# Patient Record
Sex: FEMALE | Race: WHITE | Employment: UNEMPLOYED | URBAN - METROPOLITAN AREA
[De-identification: names, ages, dates, MRNs, and addresses within clinical notes are randomized per-mention and may not be internally consistent; named-entity substitution may affect disease eponyms.]

---

## 2019-04-19 PROBLEM — J45.20 MILD INTERMITTENT ASTHMA WITHOUT COMPLICATION: Status: ACTIVE | Noted: 2019-04-19

## 2019-04-19 PROBLEM — Z00.00 WELLNESS EXAMINATION: Status: ACTIVE | Noted: 2019-04-19

## 2020-02-02 PROBLEM — L03.039 INFECTED NAILBED OF TOE: Status: ACTIVE | Noted: 2020-02-02

## 2021-04-06 ENCOUNTER — VIRTUAL VISIT (OUTPATIENT)
Dept: FAMILY MEDICINE CLINIC | Age: 31
End: 2021-04-06
Payer: MEDICAID

## 2021-04-06 DIAGNOSIS — R05.9 COUGH: Primary | ICD-10-CM

## 2021-04-06 PROCEDURE — 99213 OFFICE O/P EST LOW 20 MIN: CPT | Performed by: INTERNAL MEDICINE

## 2021-04-06 RX ORDER — BENZONATATE 200 MG/1
200 CAPSULE ORAL
Qty: 21 CAP | Refills: 0 | Status: SHIPPED | OUTPATIENT
Start: 2021-04-06 | End: 2021-04-13

## 2021-04-06 RX ORDER — CODEINE PHOSPHATE AND GUAIFENESIN 10; 100 MG/5ML; MG/5ML
5 SOLUTION ORAL
Qty: 273 ML | Refills: 0 | Status: SHIPPED | OUTPATIENT
Start: 2021-04-06 | End: 2021-04-06 | Stop reason: SDUPTHER

## 2021-04-06 RX ORDER — CODEINE PHOSPHATE AND GUAIFENESIN 10; 100 MG/5ML; MG/5ML
5 SOLUTION ORAL
Qty: 273 ML | Refills: 0 | Status: SHIPPED | OUTPATIENT
Start: 2021-04-06 | End: 2021-04-09

## 2021-04-06 RX ORDER — PREDNISONE 10 MG/1
TABLET ORAL
Qty: 21 TAB | Refills: 0 | Status: SHIPPED | OUTPATIENT
Start: 2021-04-06 | End: 2022-04-13

## 2021-04-06 RX ORDER — AMOXICILLIN 875 MG/1
875 TABLET, FILM COATED ORAL 2 TIMES DAILY
Qty: 20 TAB | Refills: 0 | Status: SHIPPED | OUTPATIENT
Start: 2021-04-06 | End: 2021-04-16

## 2021-04-06 NOTE — PROGRESS NOTES
Learning Assessment 1/31/2020   PRIMARY LEARNER Patient   HIGHEST LEVEL OF EDUCATION - PRIMARY LEARNER  GRADUATED HIGH SCHOOL OR GED   BARRIERS PRIMARY LEARNER NONE   CO-LEARNER CAREGIVER No   PRIMARY LANGUAGE ENGLISH   LEARNER PREFERENCE PRIMARY DEMONSTRATION   ANSWERED BY Patient    RELATIONSHIP SELF       1. Have you been to the ER, urgent care clinic since your last visit? Hospitalized since your last visit? No    2. Have you seen or consulted any other health care providers outside of the [address] [place] [address] since your last visit? Include any pap smears or colon screening.  No

## 2021-04-06 NOTE — PROGRESS NOTES
Consent:  She and/or her healthcare decision maker is aware that this patient-initiated Telehealth encounter is a billable service, with coverage as determined by her insurance carrier. She is aware that she may receive a bill and has provided verbal consent to proceed: Yes    I was in the office while conducting this encounter via the doxy. me platform. Marjan Dukes is a 31 y.o. female who was seen by synchronous (real-time) audio-video technology on 4/6/2021. Pt was seen at home. No other participants in this encounter. Subjective:   Cold Symptoms  Patient presents today with chief concern of sore throat that has been persistent for the last 3 days. She feels \"hot and cold\" but has not taken her temperature and denies diaphoresis. Swallowing liquids and solids is like \"swallowing razors\". Does have a history of asthma and allergies and is currently on ProAir as needed as well as Flonase as needed. She does have a cough but states that this is not constant and she has been taking cough tablets to help. Denies congestion but does state that she has right ear pain, sinus pressure as well as a headache. No sick contacts of which she is aware. PMH, SH, Medications/Allergies: reviewed, on chart. Current Outpatient Medications   Medication Sig    amoxicillin (AMOXIL) 875 mg tablet Take 1 Tab by mouth two (2) times a day for 10 days.  benzonatate (TESSALON) 200 mg capsule Take 1 Cap by mouth three (3) times daily as needed for Cough for up to 7 days.  predniSONE (STERAPRED DS) 10 mg dose pack See administration instruction per 10mg dose pack    phenol throat spray (Chloraseptic Throat Spray) 1.4 % spray Take 1 Spray by mouth as needed for Sore throat.  guaiFENesin-codeine (ROBITUSSIN AC) 100-10 mg/5 mL solution Take 5 mL by mouth three (3) times daily as needed for Cough for up to 3 days. Max Daily Amount: 15 mL.     fluticasone propionate (FLONASE) 50 mcg/actuation nasal spray 2 Sprays by Both Nostrils route daily.  ProAir HFA 90 mcg/actuation inhaler INHALE 2 PUFFS BY MOUTH EVERY 4 HOURS AS NEEDED FOR WHEEZING FOR SHORTNESS OF BREATH     No current facility-administered medications for this visit. Allergies   Allergen Reactions    Cephalexin Swelling       ROS:  Constitutional: No fever, + chills no abnormal weight loss  Respiratory: Positive for cough, negative for SOB   CV: No chest pain or Palpitations    VS review: Wt Readings from Last 3 Encounters:   01/19/21 113 lb 12.8 oz (51.6 kg)   01/31/20 113 lb 9.6 oz (51.5 kg)   04/19/19 112 lb 6.4 oz (51 kg)     BP Readings from Last 3 Encounters:   01/19/21 108/60   01/31/20 140/80   04/19/19 118/70       Objective:     General: alert, cooperative, no distress   Mental  status: mental status: alert, oriented to person, place, and time, normal mood, behavior, speech, dress, motor activity, and thought processes   Resp: resp: normal effort and no respiratory distress   Neuro: neuro: no gross deficits   Skin: skin: no discoloration or lesions of concern on visible areas       Assessment & Plan:     Patient presents today with chief complaint of sore throat, likely secondary to postnasal drip or viral etiology. However, the right ear pain is concerning along with sinus pressure on the right side more than left side. We will treat for sinusitis which will also cover for strep throat as she does have recurrence of strep throat in case this is a secondary etiology. Symptomatic treatment for cough, congestion. Warm compresses to the face to help drain the sinuses. S/sx that would warrant urgent or emergent follow up and clinical re-evaluation discussed with the patient who verbalized understanding. Orders Placed This Encounter    amoxicillin (AMOXIL) 875 mg tablet     Sig: Take 1 Tab by mouth two (2) times a day for 10 days.      Dispense:  20 Tab     Refill:  0    benzonatate (TESSALON) 200 mg capsule     Sig: Take 1 Cap by mouth three (3) times daily as needed for Cough for up to 7 days. Dispense:  21 Cap     Refill:  0    DISCONTD: guaiFENesin-codeine (ROBITUSSIN AC) 100-10 mg/5 mL solution     Sig: Take 5 mL by mouth three (3) times daily as needed for Cough for up to 3 days. Max Daily Amount: 15 mL. Dispense:  273 mL     Refill:  0    predniSONE (STERAPRED DS) 10 mg dose pack     Sig: See administration instruction per 10mg dose pack     Dispense:  21 Tab     Refill:  0    phenol throat spray (Chloraseptic Throat Spray) 1.4 % spray     Sig: Take 1 Spray by mouth as needed for Sore throat. Dispense:  1 Bottle     Refill:  1    guaiFENesin-codeine (ROBITUSSIN AC) 100-10 mg/5 mL solution     Sig: Take 5 mL by mouth three (3) times daily as needed for Cough for up to 3 days. Max Daily Amount: 15 mL. Dispense:  273 mL     Refill:  0         Ailyn R Good, DO      Due to this being a TeleHealth evaluation, many elements of the physical examination are unable to be assessed. We discussed the expected course, resolution and complications of the diagnosis(es) in detail. Medication risks, benefits, costs, interactions, and alternatives were discussed as indicated. I advised her to contact the office if her condition worsens, changes or fails to improve as anticipated. She expressed understanding with the diagnosis(es) and plan. Pursuant to the emergency declaration under the [facility] [place] [address] [facility] authority and the [employer] Act, this Virtual  Visit was conducted, with patient's consent, to reduce the patient's risk of exposure to COVID-19 and provide continuity of care for an established patient. Services were provided through a video synchronous discussion virtually to substitute for in-person clinic visit. Services were provided through a synchronous discussion virtually to substitute for in-person clinic visit.  I was in the office. The patient was at home.     CPT Codes 01791-29526 for Established Patients may apply to this Telehealth Visit

## 2021-06-24 ENCOUNTER — OFFICE VISIT (OUTPATIENT)
Dept: FAMILY MEDICINE CLINIC | Age: 31
End: 2021-06-24
Payer: MEDICAID

## 2021-06-24 VITALS
DIASTOLIC BLOOD PRESSURE: 60 MMHG | BODY MASS INDEX: 22.8 KG/M2 | WEIGHT: 113.1 LBS | SYSTOLIC BLOOD PRESSURE: 100 MMHG | HEIGHT: 59 IN | OXYGEN SATURATION: 100 % | HEART RATE: 70 BPM | RESPIRATION RATE: 18 BRPM | TEMPERATURE: 97.2 F

## 2021-06-24 DIAGNOSIS — J45.20 MILD INTERMITTENT ASTHMA WITHOUT COMPLICATION: Primary | ICD-10-CM

## 2021-06-24 PROCEDURE — 99213 OFFICE O/P EST LOW 20 MIN: CPT | Performed by: NURSE PRACTITIONER

## 2021-06-24 RX ORDER — FLUTICASONE PROPIONATE 50 MCG
2 SPRAY, SUSPENSION (ML) NASAL DAILY
Qty: 1 BOTTLE | Refills: 5 | Status: SHIPPED | OUTPATIENT
Start: 2021-06-24 | End: 2022-04-13 | Stop reason: SDUPTHER

## 2021-06-24 RX ORDER — ALBUTEROL SULFATE 90 UG/1
AEROSOL, METERED RESPIRATORY (INHALATION)
Qty: 9 G | Refills: 1 | Status: SHIPPED | OUTPATIENT
Start: 2021-06-24 | End: 2022-04-13 | Stop reason: SDUPTHER

## 2021-06-24 NOTE — PROGRESS NOTES
1. Have you been to the ER, urgent care clinic since your last visit? Hospitalized since your last visit? No    2. Have you seen or consulted any other health care providers outside of the [address] [place] [address] since your last visit? Include any pap smears or colon screening.  No      Chief Complaint   Patient presents with    Asthma     refills         Visit Vitals  /60 (BP 1 Location: Left upper arm, BP Patient Position: Sitting, BP Cuff Size: Adult)   Pulse 70   Temp 97.2 °F (36.2 °C) (Temporal)   Resp 18   Ht 4' 11\" (1.499 m)   Wt 113 lb 1.6 oz (51.3 kg)   SpO2 100%   BMI 22.84 kg/m²       Pain Scale: 0 - No pain/10  Pain Location:

## 2021-06-27 NOTE — ACP (ADVANCE CARE PLANNING)
Discussed importance of advanced medical directives with patient. Patient is capable of making decisions.   Inez Soares NP-C

## 2021-06-27 NOTE — PROGRESS NOTES
Subjective:     Severiano Generous is a 27 y.o. female who presents today with the following:  Chief Complaint   Patient presents with    Asthma     refills       Patient Active Problem List   Diagnosis Code    Seizure disorder in pregnancy ([facility] O99.350, G40.909    Wellness examination Z00.00    Mild intermittent asthma without complication K69.88    Infected nailbed of toe L03.039    Pregnancy complicated by fetal skeletal dysplasia O35. 8XX0         COMPLIANT WITH MEDICATION:   Denies chest pain, dyspnea, palpitations, headache and blurred vision. Blood pressure normotensive. Asthma; no recent flare ups. Well controlled with Flonase for allergies. She would like a refill Pro Air HFA before she goes to [place] with her family. She denies wheezing and shortness of breath. depression screening addressed not at risk    abuse screening addressed denies    learning assessment addressed reviewed nurses notes    fall risk addressed not at risk    HM: addressed    ROS:  Gen: denies fever, chills, fatigue, weight loss, weight gain  HEENT:denies blurry vision, nasal congestion, sore throat  Resp: denies dypsnea, cough, wheezing  CV: denies chest pain radiating to the jaws or arms, palpitations, lower extremity edema  Abd: denies nausea, vomiting, diarrhea, constipation  Neuro: denies numbness/tingling  Endo: denies polyuria, polydipsia, heat/cold intolerance  Heme: no lymphadenopathy    Allergies   Allergen Reactions    Cephalexin Swelling         Current Outpatient Medications:     ProAir HFA 90 mcg/actuation inhaler, INHALE 2 PUFFS BY MOUTH EVERY 4 HOURS AS NEEDED FOR WHEEZING FOR SHORTNESS OF BREATH, Disp: 9 g, Rfl: 1    fluticasone propionate (FLONASE) 50 mcg/actuation nasal spray, 2 Sprays by Both Nostrils route daily. , Disp: 1 Bottle, Rfl: 5    predniSONE (STERAPRED DS) 10 mg dose pack, See administration instruction per 10mg dose pack (Patient not taking: Reported on 6/24/2021), Disp: 21 Tab, Rfl: 0   phenol throat spray (Chloraseptic Throat Spray) 1.4 % spray, Take 1 Spray by mouth as needed for Sore throat. (Patient not taking: Reported on 2021), Disp: 1 Bottle, Rfl: 1    Past Medical History:   Diagnosis Date    Abnormal Pap smear     Unspecified epilepsy without mention of intractable epilepsy     4 seizures in past 3 mos       Past Surgical History:   Procedure Laterality Date    HX GYN      csection.  HX TUBAL LIGATION      [place] ABDOMEN SURGERY PROC UNLISTED      niessen surgery and PEG tube as child.  [place]  DELIVERY ONLY         Social History     Tobacco Use   Smoking Status Current Every Day Smoker    Packs/day: 1.00    Years: 3.00    Pack years: 3.00   Smokeless Tobacco Never Used       Social History     Socioeconomic History    Marital status:      Spouse name: Not on file    Number of children: Not on file    Years of education: Not on file    Highest education level: Not on file   Tobacco Use    Smoking status: Current Every Day Smoker     Packs/day: 1.00     Years: 3.00     Pack years: 3.00    Smokeless tobacco: Never Used   Vaping Use    Vaping Use: Never used   Substance and Sexual Activity    Alcohol use: No    Drug use: Never    Sexual activity: Yes     Partners: Male     Birth control/protection: Pill, Condom, Surgical   Other Topics Concern     Service No    Blood Transfusions Yes    Caffeine Concern Yes    Occupational Exposure No    Hobby Hazards No    Sleep Concern No    Stress Concern No    Weight Concern Yes    Special Diet No    Back Care Yes    Exercise Yes    Seat Belt Yes    Self-Exams Yes     Social Determinants of Health     Financial Resource Strain:     Difficulty of Paying Living Expenses:    Food Insecurity:     Worried About Running Out of Food in the Last Year:     Ran Out of Food in the Last Year:    Transportation Needs:     Lack of Transportation (Medical):      Lack of Transportation (Non-Medical): Physical Activity:     Days of Exercise per Week:     Minutes of Exercise per Session:    Stress:     Feeling of Stress :    Social Connections:     Frequency of Communication with Friends and Family:     Frequency of Social Gatherings with Friends and Family:     Attends [employer] Services:     Active Member of Clubs or Organizations:     Attends Club or Organization Meetings:     Marital Status:        Family History   Problem Relation Age of Onset    Stroke Mother     Cancer Maternal Aunt     Diabetes Maternal Grandmother     Heart Disease Maternal Grandmother     Hypertension Maternal Grandmother     Heart Disease Maternal Grandfather          Objective:     Visit Vitals  /60 (BP 1 Location: Left upper arm, BP Patient Position: Sitting, BP Cuff Size: Adult)   Pulse 70   Temp 97.2 °F (36.2 °C) (Temporal)   Resp 18   Ht 4' 11\" (1.499 m)   Wt 113 lb 1.6 oz (51.3 kg)   SpO2 100%   BMI 22.84 kg/m²     Body mass index is 22.84 kg/m². General: Alert and oriented. No acute distress. Well nourished  HEENT :  Ears:TMs are normal. Canals are clear. Eyes: pupils equal, round, react to light and accommodation. Extra ocular movements intact. Nose: patent. Mouth and throat is clear. Neck:supple full range of motion no thyromegaly. Trachea midline, No carotid bruits. No significant lymphadenopathy  Lungs[de-identified] clear to auscultation without wheezes, rales, or rhonchi. Heart :RRR, S1 & S2 are normal intensity. No murmur; no gallop  Abdomen: bowel sounds active. No tenderness, guarding, rebound, masses, hepatic or spleen enlargement  Back: no CVA tenderness. Extremities: without clubbing, cyanosis, or edema  Pulses: radial and femoral pulses are normal  Neuro: HMF intact. Cranial nerves II through XII grossly normal.  Motor: is 5 over 5 and symmetrical.   Deep tendon reflexes: +2 equal  Psych:appropriate behavior, mood, affect and judgement.    Results for orders placed or performed in visit on 04/19/19 CBC WITH AUTOMATED DIFF   Result Value Ref Range    WBC 5.9 3.4 - 10.8 x10E3/uL    RBC 4.39 3.77 - 5.28 x10E6/uL    HGB 13.1 11.1 - 15.9 g/dL    HCT 39.8 34.0 - 46.6 %    MCV 91 79 - 97 fL    MCH 29.8 26.6 - 33.0 pg    MCHC 32.9 31.5 - 35.7 g/dL    RDW 14.4 12.3 - 15.4 %    PLATELET 973 015 - 652 x10E3/uL    NEUTROPHILS 45 Not Estab. %    Lymphocytes 37 Not Estab. %    MONOCYTES 9 Not Estab. %    EOSINOPHILS 8 Not Estab. %    BASOPHILS 1 Not Estab. %    ABS. NEUTROPHILS 2.7 1.4 - 7.0 x10E3/uL    Abs Lymphocytes 2.2 0.7 - 3.1 x10E3/uL    ABS. MONOCYTES 0.5 0.1 - 0.9 x10E3/uL    ABS. EOSINOPHILS 0.5 (H) 0.0 - 0.4 x10E3/uL    ABS. BASOPHILS 0.1 0.0 - 0.2 x10E3/uL    IMMATURE GRANULOCYTES 0 Not Estab. %    ABS. IMM. GRANS. 0.0 0.0 - 0.1 x10E3/uL   TSH RFX ON ABNORMAL TO FREE T4   Result Value Ref Range    TSH 0.940 0.450 - 1.992 uIU/mL   METABOLIC PANEL, COMPREHENSIVE   Result Value Ref Range    Glucose 79 65 - 99 mg/dL    BUN 9 6 - 20 mg/dL    Creatinine 0.83 0.57 - 1.00 mg/dL    GFR est non-AA 95 >59 mL/min/1.73    GFR est  >59 mL/min/1.73    BUN/Creatinine ratio 11 9 - 23    Sodium 140 134 - 144 mmol/L    Potassium 4.3 3.5 - 5.2 mmol/L    Chloride 104 96 - 106 mmol/L    CO2 22 20 - 29 mmol/L    Calcium 9.1 8.7 - 10.2 mg/dL    Protein, total 6.6 6.0 - 8.5 g/dL    Albumin 4.4 3.5 - 5.5 g/dL    GLOBULIN, TOTAL 2.2 1.5 - 4.5 g/dL    A-G Ratio 2.0 1.2 - 2.2    Bilirubin, total 0.6 0.0 - 1.2 mg/dL    Alk. phosphatase 51 39 - 117 IU/L    AST (SGOT) 14 0 - 40 IU/L    ALT (SGPT) 16 0 - 32 IU/L   LIPID PANEL   Result Value Ref Range    Cholesterol, total 191 100 - 199 mg/dL    Triglyceride 46 0 - 149 mg/dL    HDL Cholesterol 65 >39 mg/dL    VLDL, calculated 9 5 - 40 mg/dL    LDL, calculated 117 (H) 0 - 99 mg/dL   HEMOGLOBIN A1C WITH EAG   Result Value Ref Range    Hemoglobin A1c 5.2 4.8 - 5.6 %    Estimated average glucose 103 mg/dL       No results found for this visit on 06/24/21.     Assessment/ Plan:       ICD-10-CM ICD-9-CM    1. Mild intermittent asthma without complication  S24.88 371.93        Orders Placed This Encounter    ProAir HFA 90 mcg/actuation inhaler     Sig: INHALE 2 PUFFS BY MOUTH EVERY 4 HOURS AS NEEDED FOR WHEEZING FOR SHORTNESS OF BREATH     Dispense:  9 g     Refill:  1    fluticasone propionate (FLONASE) 50 mcg/actuation nasal spray     Si Sprays by Both Nostrils route daily. Dispense:  1 Bottle     Refill:  5         Verbal and written instructions (see AVS) provided. Patient expresses understanding of diagnosis and treatment plan.     Health Maintenance Due   Topic Date Due    Hepatitis C Screening  Never done    PAP AKA CERVICAL CYTOLOGY  2021               LIANNA DawsonP-C

## 2022-03-19 PROBLEM — J45.20 MILD INTERMITTENT ASTHMA WITHOUT COMPLICATION: Status: ACTIVE | Noted: 2019-04-19

## 2022-03-19 PROBLEM — Z00.00 WELLNESS EXAMINATION: Status: ACTIVE | Noted: 2019-04-19

## 2022-03-19 PROBLEM — L03.039 INFECTED NAILBED OF TOE: Status: ACTIVE | Noted: 2020-02-02

## 2022-04-13 ENCOUNTER — VIRTUAL VISIT (OUTPATIENT)
Dept: FAMILY MEDICINE CLINIC | Age: 32
End: 2022-04-13
Payer: MEDICAID

## 2022-04-13 DIAGNOSIS — J00 ACUTE RHINITIS: ICD-10-CM

## 2022-04-13 DIAGNOSIS — J45.20 MILD INTERMITTENT ASTHMA WITHOUT COMPLICATION: Primary | ICD-10-CM

## 2022-04-13 DIAGNOSIS — G44.209 ACUTE NON INTRACTABLE TENSION-TYPE HEADACHE: ICD-10-CM

## 2022-04-13 PROCEDURE — 99442 PR PHYS/QHP TELEPHONE EVALUATION 11-20 MIN: CPT

## 2022-04-13 RX ORDER — MONTELUKAST SODIUM 10 MG/1
10 TABLET ORAL DAILY
Qty: 90 TABLET | Refills: 3 | Status: SHIPPED | OUTPATIENT
Start: 2022-04-13

## 2022-04-13 RX ORDER — ALBUTEROL SULFATE 90 UG/1
AEROSOL, METERED RESPIRATORY (INHALATION)
Qty: 9 G | Refills: 1 | Status: SHIPPED | OUTPATIENT
Start: 2022-04-13

## 2022-04-13 RX ORDER — CETIRIZINE HYDROCHLORIDE 10 MG/1
CAPSULE, LIQUID FILLED ORAL DAILY
COMMUNITY

## 2022-04-13 RX ORDER — IBUPROFEN 600 MG/1
600 TABLET ORAL
Qty: 90 TABLET | Refills: 1 | OUTPATIENT
Start: 2022-04-13 | End: 2022-05-30

## 2022-04-13 RX ORDER — FLUTICASONE PROPIONATE 50 MCG
2 SPRAY, SUSPENSION (ML) NASAL DAILY
Qty: 1 EACH | Refills: 5 | Status: SHIPPED | OUTPATIENT
Start: 2022-04-13

## 2022-04-13 NOTE — PATIENT INSTRUCTIONS
Managing Your Allergies: Care Instructions  Your Care Instructions     Managing your allergies is an important part of staying healthy. Your doctor will help you find out what may be the cause of the allergies. Common causes of symptoms are house dust and dust mites, animal dander, mold, and pollen. As soon as you know what triggers your symptoms, try to avoid those things. This can help prevent allergy symptoms, asthma, and other health problems. Ask your doctor about allergy medicine or immunotherapy. These treatments may help reduce or prevent allergy symptoms. Follow-up care is a key part of your treatment and safety. Be sure to make and go to all appointments, and call your doctor if you are having problems. It's also a good idea to know your test results and keep a list of the medicines you take. How can you care for yourself at home? · If you have been told by your doctor that dust or dust mites are causing your allergy, decrease the dust around your bed:  ? Wash sheets, pillowcases, and other bedding in hot water every week. ? Use dust-proof covers for pillows, duvets, and mattresses. Avoid plastic covers because they tear easily and do not \"breathe. \" Wash as instructed on the label. ? Do not use any blankets and pillows that you do not need. ? Use blankets that you can wash in your washing machine. ? Consider removing drapes and carpets, which attract and hold dust, from your bedroom. · If you are allergic to house dust and mites, do not use home humidifiers. Your doctor can suggest ways you can control dust and mites. · Look for signs of cockroaches. Cockroaches cause allergic reactions. Use cockroach baits to get rid of them. Then, clean your home well. Cockroaches like areas where grocery bags, newspapers, empty bottles, or cardboard boxes are stored. Do not keep these inside your home, and keep trash and food containers sealed.  Seal off any spots where cockroaches might enter your home.  · If you are allergic to mold, get rid of furniture, rugs, and drapes that smell musty. Check for mold in the bathroom. · If you are allergic to outdoor pollen or mold spores, use air-conditioning. Change or clean all filters every month. Keep windows closed. · If you are allergic to pollen, stay inside when pollen counts are high. Use a vacuum  with a HEPA filter or a double-thickness filter at least two times each week. · Stay inside when air pollution is bad. Avoid paint fumes, perfumes, and other strong odors. · Avoid conditions that make your allergies worse. Stay away from smoke. Do not smoke or let anyone else smoke in your house. Do not use fireplaces or wood-burning stoves. · If you are allergic to your pets, change the air filter in your furnace every month. Use high-efficiency filters. · If you are allergic to pet dander, keep pets outside or out of your bedroom. Old carpet and cloth furniture can hold a lot of animal dander. You may need to replace them. When should you call for help? Give an epinephrine shot if:    · You think you are having a severe allergic reaction. After giving an epinephrine shot call 911, even if you feel better. Call 911 if:    · You have symptoms of a severe allergic reaction. These may include:  ? Sudden raised, red areas (hives) all over your body. ? Swelling of the throat, mouth, lips, or tongue. ? Trouble breathing. ? Passing out (losing consciousness). Or you may feel very lightheaded or suddenly feel weak, confused, or restless.     · You have been given an epinephrine shot, even if you feel better. Call your doctor now or seek immediate medical care if:    · You have symptoms of an allergic reaction, such as:  ? A rash or hives (raised, red areas on the skin). ? Itching. ? Swelling. ? Belly pain, nausea, or vomiting.    Watch closely for changes in your health, and be sure to contact your doctor if:    · Your allergies get worse.     · You need help controlling your allergies.     · You have questions about allergy testing.     · You do not get better as expected. Where can you learn more? Go to http://www.gray.com/  Enter L249 in the search box to learn more about \"Managing Your Allergies: Care Instructions. \"  Current as of: February 10, 2021               Content Version: 13.2  © 5092-0093 [employer]. Care instructions adapted under license by [employer] (which disclaims liability or warranty for this information). If you have questions about a medical condition or this instruction, always ask your healthcare professional. [place] [address] any warranty or liability for your use of this information.

## 2022-04-13 NOTE — PROGRESS NOTES
1. Have you been to the ER, urgent care clinic since your last visit? No  Hospitalized since your last visit? No    2. Have you seen or consulted any other health care providers outside of the [address] [place] [address] since your last visit? Include any pap smears or colon screening.  No

## 2022-04-13 NOTE — PROGRESS NOTES
Hung Echols (: 1990) is a 27 y.o. female, established patient, here for evaluation of the following chief complaint(s):   Headache (x days, temples and across her forehead, 8/10), Cold Symptoms (little dry cough), Sore Throat (with some drainage), and Sweats (hot and cold moments)       ASSESSMENT/PLAN:  Below is the assessment and plan developed based on review of pertinent history, labs, studies, and medications. 1. Mild intermittent asthma without complication  -     ProAir HFA 90 mcg/actuation inhaler; INHALE 2 PUFFS BY MOUTH EVERY 4 HOURS AS NEEDED FOR WHEEZING FOR SHORTNESS OF BREATH, Normal, Disp-9 g, R-1, LAURA  -     montelukast (SINGULAIR) 10 mg tablet; Take 1 Tablet by mouth daily. , Normal, Disp-90 Tablet, R-3  2. Acute rhinitis  -     fluticasone propionate (FLONASE) 50 mcg/actuation nasal spray; 2 Sprays by Both Nostrils route daily. , Normal, Disp-1 Each, R-5  -     montelukast (SINGULAIR) 10 mg tablet; Take 1 Tablet by mouth daily. , Normal, Disp-90 Tablet, R-3  3. Acute non intractable tension-type headache  -     ibuprofen (MOTRIN) 600 mg tablet; Take 1 Tablet by mouth every eight (8) hours as needed for Pain., Normal, Disp-90 Tablet, R-1       Return if symptoms worsen or fail to improve. SUBJECTIVE/OBJECTIVE:  Hung Echols notes headache onset about 2 days ago; pain is located in both temples and continues in a band around her forehead. She took a dose of APAP with some relief. Yesterday, she noted a mild cough and sore throat; she has had chills with TMax 99.1. No sick contacts that she is aware of. She has been taking Zyrtec for about 1 week. Review of Systems   Constitutional: Positive for chills. Negative for activity change, appetite change, fatigue and fever. HENT: Positive for congestion and sore throat. Negative for ear pain. Eyes: Negative. Respiratory: Positive for cough. Negative for chest tightness, shortness of breath and wheezing.     Musculoskeletal: Negative. Negative for arthralgias and myalgias. Skin: Negative. Negative for rash. Allergic/Immunologic: Positive for environmental allergies. Neurological: Positive for headaches. Negative for dizziness. Patient-Reported Pulse Oximetry: 96  Patient-Reported Temperature: 99.1  Patient-Reported LMP: 03/15/2022     On this date 04/13/2022 I have spent 20 minutes reviewing previous notes, test results and face to face (virtual) with the patient discussing the diagnosis and importance of compliance with the treatment plan as well as documenting on the day of the visit. Brendamain Josiah, was evaluated through a synchronous (real-time) audio only encounter. The patient (or guardian if applicable) is aware that this is a billable service, which includes applicable co-pays. Verbal consent to proceed has been obtained. The visit was conducted pursuant to the emergency declaration under the [address] [place] [address], [address] [place] [address] authority and the [employer] and [employer] Act. Patient identification was verified, and a caregiver was present when appropriate. The patient was located at home in a state where the provider was licensed to provide care. An electronic signature was used to authenticate this note.   -- Harlan Verdugo NP

## 2022-05-20 ENCOUNTER — APPOINTMENT (OUTPATIENT)
Dept: GENERAL RADIOLOGY | Age: 32
End: 2022-05-20
Attending: EMERGENCY MEDICINE
Payer: MEDICAID

## 2022-05-20 ENCOUNTER — HOSPITAL ENCOUNTER (EMERGENCY)
Age: 32
Discharge: HOME | End: 2022-05-20
Attending: EMERGENCY MEDICINE
Payer: MEDICAID

## 2022-05-20 ENCOUNTER — NURSE TRIAGE (OUTPATIENT)
Dept: OTHER | Facility: CLINIC | Age: 32
End: 2022-05-20

## 2022-05-20 VITALS
TEMPERATURE: 98.4 F | HEART RATE: 90 BPM | HEIGHT: 59 IN | BODY MASS INDEX: 22.78 KG/M2 | DIASTOLIC BLOOD PRESSURE: 70 MMHG | WEIGHT: 113 LBS | RESPIRATION RATE: 18 BRPM | SYSTOLIC BLOOD PRESSURE: 114 MMHG | OXYGEN SATURATION: 100 %

## 2022-05-20 DIAGNOSIS — R07.9 CHEST PAIN, UNSPECIFIED TYPE: Primary | ICD-10-CM

## 2022-05-20 LAB
ALBUMIN SERPL-MCNC: 3.8 G/DL (ref 3.5–5)
ALBUMIN/GLOB SERPL: 1.1 {RATIO} (ref 1.1–2.2)
ALP SERPL-CCNC: 60 U/L (ref 45–117)
ALT SERPL-CCNC: 21 U/L (ref 12–78)
ANION GAP SERPL CALC-SCNC: 12 MMOL/L (ref 5–15)
APPEARANCE UR: CLEAR
AST SERPL-CCNC: 12 U/L (ref 15–37)
BACTERIA URNS QL MICRO: NEGATIVE /HPF
BASOPHILS # BLD: 0.1 K/UL (ref 0–0.1)
BASOPHILS NFR BLD: 1 % (ref 0–1)
BILIRUB SERPL-MCNC: 0.3 MG/DL (ref 0.2–1)
BILIRUB UR QL: NEGATIVE
BUN SERPL-MCNC: 10 MG/DL (ref 6–20)
BUN/CREAT SERPL: 11 (ref 12–20)
CALCIUM SERPL-MCNC: 8.6 MG/DL (ref 8.5–10.1)
CHLORIDE SERPL-SCNC: 106 MMOL/L (ref 97–108)
CO2 SERPL-SCNC: 24 MMOL/L (ref 21–32)
COLOR UR: NORMAL
CREAT SERPL-MCNC: 0.95 MG/DL (ref 0.55–1.02)
D DIMER PPP FEU-MCNC: 0.55 MG/L FEU (ref 0–0.65)
DIFFERENTIAL METHOD BLD: NORMAL
EOSINOPHIL # BLD: 0.1 K/UL (ref 0–0.4)
EOSINOPHIL NFR BLD: 2 % (ref 0–7)
EPITH CASTS URNS QL MICRO: NORMAL /LPF
ERYTHROCYTE [DISTWIDTH] IN BLOOD BY AUTOMATED COUNT: 13.3 % (ref 11.5–14.5)
GLOBULIN SER CALC-MCNC: 3.5 G/DL (ref 2–4)
GLUCOSE SERPL-MCNC: 146 MG/DL (ref 65–100)
GLUCOSE UR STRIP.AUTO-MCNC: NEGATIVE MG/DL
HCG UR QL: NEGATIVE
HCT VFR BLD AUTO: 36.3 % (ref 35–47)
HGB BLD-MCNC: 12.4 G/DL (ref 11.5–16)
HGB UR QL STRIP: NEGATIVE
IMM GRANULOCYTES # BLD AUTO: 0 K/UL (ref 0–0.04)
IMM GRANULOCYTES NFR BLD AUTO: 0 % (ref 0–0.5)
KETONES UR QL STRIP.AUTO: NEGATIVE MG/DL
LEUKOCYTE ESTERASE UR QL STRIP.AUTO: NEGATIVE
LYMPHOCYTES # BLD: 2 K/UL (ref 0.8–3.5)
LYMPHOCYTES NFR BLD: 35 % (ref 12–49)
MCH RBC QN AUTO: 29.5 PG (ref 26–34)
MCHC RBC AUTO-ENTMCNC: 34.2 G/DL (ref 30–36.5)
MCV RBC AUTO: 86.2 FL (ref 80–99)
MONOCYTES # BLD: 0.4 K/UL (ref 0–1)
MONOCYTES NFR BLD: 8 % (ref 5–13)
NEUTS SEG # BLD: 3.1 K/UL (ref 1.8–8)
NEUTS SEG NFR BLD: 54 % (ref 32–75)
NITRITE UR QL STRIP.AUTO: NEGATIVE
NRBC # BLD: 0 K/UL (ref 0–0.01)
NRBC BLD-RTO: 0 PER 100 WBC
PH UR STRIP: 6 [PH] (ref 5–8)
PLATELET # BLD AUTO: 346 K/UL (ref 150–400)
PMV BLD AUTO: 9.2 FL (ref 8.9–12.9)
POTASSIUM SERPL-SCNC: 3.7 MMOL/L (ref 3.5–5.1)
PROT SERPL-MCNC: 7.3 G/DL (ref 6.4–8.2)
PROT UR STRIP-MCNC: NEGATIVE MG/DL
RBC # BLD AUTO: 4.21 M/UL (ref 3.8–5.2)
RBC #/AREA URNS HPF: NORMAL /HPF (ref 0–5)
SODIUM SERPL-SCNC: 142 MMOL/L (ref 136–145)
SP GR UR REFRACTOMETRY: 1.03 (ref 1–1.03)
TROPONIN-HIGH SENSITIVITY: 5 NG/L (ref 0–51)
UR CULT HOLD, URHOLD: NORMAL
UROBILINOGEN UR QL STRIP.AUTO: 1 EU/DL (ref 0.2–1)
WBC # BLD AUTO: 5.7 K/UL (ref 3.6–11)
WBC URNS QL MICRO: NORMAL /HPF (ref 0–4)

## 2022-05-20 PROCEDURE — 36415 COLL VENOUS BLD VENIPUNCTURE: CPT

## 2022-05-20 PROCEDURE — 85379 FIBRIN DEGRADATION QUANT: CPT

## 2022-05-20 PROCEDURE — 85025 COMPLETE CBC W/AUTO DIFF WBC: CPT

## 2022-05-20 PROCEDURE — 84484 ASSAY OF TROPONIN QUANT: CPT

## 2022-05-20 PROCEDURE — 99285 EMERGENCY DEPT VISIT HI MDM: CPT

## 2022-05-20 PROCEDURE — 81001 URINALYSIS AUTO W/SCOPE: CPT

## 2022-05-20 PROCEDURE — 94762 N-INVAS EAR/PLS OXIMTRY CONT: CPT

## 2022-05-20 PROCEDURE — 71046 X-RAY EXAM CHEST 2 VIEWS: CPT

## 2022-05-20 PROCEDURE — 93005 ELECTROCARDIOGRAM TRACING: CPT

## 2022-05-20 PROCEDURE — 80053 COMPREHEN METABOLIC PANEL: CPT

## 2022-05-20 PROCEDURE — 81025 URINE PREGNANCY TEST: CPT

## 2022-05-20 PROCEDURE — 74011250636 HC RX REV CODE- 250/636: Performed by: EMERGENCY MEDICINE

## 2022-05-20 PROCEDURE — 96374 THER/PROPH/DIAG INJ IV PUSH: CPT

## 2022-05-20 RX ORDER — KETOROLAC TROMETHAMINE 30 MG/ML
30 INJECTION, SOLUTION INTRAMUSCULAR; INTRAVENOUS
Status: COMPLETED | OUTPATIENT
Start: 2022-05-20 | End: 2022-05-20

## 2022-05-20 RX ADMIN — KETOROLAC TROMETHAMINE 30 MG: 30 INJECTION, SOLUTION INTRAMUSCULAR at 13:45

## 2022-05-20 NOTE — TELEPHONE ENCOUNTER
Received call from [address] at [facility] with Red Flag Complaint. Subjective: Caller states \"has a cough, worried about bronchitis. Also chest pain on left side with pressure. \"     Current Symptoms: cough, chest pain  Chest pain is constant and on left side  No breathing difficulty  Radiated to shoulder yesterday  Has a deep wet cough  No personal cardiac history    Onset: couple days    Associated Symptoms: NA    Pain Severity: 6/10; sharp; constant    Temperature: no fever    What has been tried: tylenol    LMP: NA Pregnant: No    Recommended disposition: Call  Now         Care advice provided, patient verbalizes understanding; denies any other questions or concerns; instructed to call back for any new or worsening symptoms. Patient/caller agrees to calling 911    is there with her. She is agreeable to call 911. Attention Provider: Thank you for allowing me to participate in the care of your patient. The patient was connected to triage in response to information provided to the ECC. Please do not respond through this encounter as the response is not directed to a shared pool.     Reason for Disposition   Chest pain lasting longer than 5 minutes and ANY of the following:* Over 40years old* Over 27years old and at least one cardiac risk factor (e.g., diabetes mellitus, high blood pressure, high cholesterol, smoker, or strong family history of heart disease)* History of heart disease (i.e., angina, heart attack, heart failure, bypass surgery, takes nitroglycerin)* Pain is crushing, pressure-like, or heavy    Protocols used: CHEST PAIN-ADULT-[place]

## 2022-05-20 NOTE — ED PROVIDER NOTES
28-year-old female with a history of asthma presents with a chief complaint of chest and back pain. Patient reports symptoms for 3 days. She describes the pain as sharp in nature. She does not rated on a scale. She denies aggravating or alleviating factors. The pain is in the left lower lung and left upper lung. She has had a cough which has been nonproductive. She is a smoker. She has not on any birth control. She denies history of similar symptoms in the past.           Past Medical History:   Diagnosis Date    Abnormal Pap smear     Unspecified epilepsy without mention of intractable epilepsy     4 seizures in past 3 mos       Past Surgical History:   Procedure Laterality Date    HX GYN      csection.  HX TUBAL LIGATION      [place] ABDOMEN SURGERY PROC UNLISTED      niessen surgery and PEG tube as child.     [place]  DELIVERY ONLY           Family History:   Problem Relation Age of Onset    Stroke Mother     Cancer Maternal Aunt     Diabetes Maternal Grandmother     Heart Disease Maternal Grandmother     Hypertension Maternal Grandmother     Heart Disease Maternal Grandfather        Social History     Socioeconomic History    Marital status:      Spouse name: Not on file    Number of children: Not on file    Years of education: Not on file    Highest education level: Not on file   Occupational History    Not on file   Tobacco Use    Smoking status: Current Every Day Smoker     Packs/day: 1.00     Years: 3.00     Pack years: 3.00    Smokeless tobacco: Never Used   Vaping Use    Vaping Use: Never used   Substance and Sexual Activity    Alcohol use: No    Drug use: Never    Sexual activity: Yes     Partners: Male     Birth control/protection: Pill, Condom, Surgical   Other Topics Concern     Service No    Blood Transfusions Yes    Caffeine Concern Yes    Occupational Exposure No    Hobby Hazards No    Sleep Concern No    Stress Concern No    Weight Concern Yes    Special Diet No    Back Care Yes    Exercise Yes    Bike Helmet Not Asked    Seat Belt Yes    Self-Exams Yes   Social History Narrative    Not on file     Social Determinants of Health     Financial Resource Strain:     Difficulty of Paying Living Expenses: Not on file   Food Insecurity:     Worried About Running Out of Food in the Last Year: Not on file    Marianne of Food in the Last Year: Not on file   Transportation Needs:     Lack of Transportation (Medical): Not on file    Lack of Transportation (Non-Medical): Not on file   Physical Activity:     Days of Exercise per Week: Not on file    Minutes of Exercise per Session: Not on file   Stress:     Feeling of Stress : Not on file   Social Connections:     Frequency of Communication with Friends and Family: Not on file    Frequency of Social Gatherings with Friends and Family: Not on file    Attends Restorationism Services: Not on file    Active Member of [address] [place] [address] or Organizations: Not on file    Attends Club or Organization Meetings: Not on file    Marital Status: Not on file   Intimate Partner Violence:     Fear of Current or Ex-Partner: Not on file    Emotionally Abused: Not on file    Physically Abused: Not on file    Sexually Abused: Not on file   Housing Stability:     Unable to Pay for Housing in the Last Year: Not on file    Number of [place] in the Last Year: Not on file    Unstable Housing in the Last Year: Not on file         ALLERGIES: Cephalexin    Review of Systems   Constitutional: Negative for fever. HENT: Negative for rhinorrhea. Respiratory: Negative for shortness of breath. Cardiovascular: Positive for chest pain. Gastrointestinal: Negative for abdominal pain. Genitourinary: Negative for dysuria. Musculoskeletal: Positive for back pain. Skin: Negative for wound. Neurological: Negative for headaches. Psychiatric/Behavioral: Negative for confusion.        There were no vitals filed for this visit.         Physical Exam  Vitals and nursing note reviewed. Constitutional:       General: She is not in acute distress. Appearance: Normal appearance. She is well-developed. She is not ill-appearing, toxic-appearing or diaphoretic. HENT:      Head: Normocephalic and atraumatic. Eyes:      Extraocular Movements: Extraocular movements intact. Cardiovascular:      Rate and Rhythm: Normal rate. Pulses: Normal pulses. Heart sounds: Normal heart sounds. Pulmonary:      Effort: Pulmonary effort is normal. No respiratory distress. Breath sounds: Normal breath sounds. Abdominal:      General: There is no distension. Palpations: Abdomen is soft. Musculoskeletal:         General: Normal range of motion. Cervical back: Normal range of motion. Skin:     General: Skin is warm and dry. Neurological:      Mental Status: She is alert and oriented to person, place, and time. Psychiatric:         Mood and Affect: Mood normal.          MDM  Number of Diagnoses or Management Options  Chest pain, unspecified type  Diagnosis management comments:   Patient presents with chest pain and back pain. Differentials include but not limited to pneumonia, PE, pneumothorax, pleurisy, pyelonephritis among others. Chest x-ray and labs were obtained along with EKG. EKG is nonischemic. Labs show normal D-dimer and white blood cell count. Chest x-ray shows no evidence of pneumothorax or pneumonia. Urinalysis shows no evidence of infection. Patient will be advised to follow-up with family medicine. Discussed my clinical impression(s), any labs and/or radiology results with the patient. I answered any questions and addressed any concerns. Discussed the importance of following up with their primary care physician and/or specialist(s). Discussed signs or symptoms that would warrant return back to the ER for further evaluation. The patient is agreeable with discharge.     EKG shows sinus rhythm at a rate of 83, normal intervals, normal axis, no ischemic changes.          Procedures

## 2022-05-20 NOTE — DISCHARGE INSTRUCTIONS
Thank you for allowing us to provide you with medical care today. We realize that you have many choices for your emergency care needs. We thank you for choosing [facility]. Please choose us in the future for any continued health care needs. The exam and treatment you received in the Emergency Department were for an emergent problem and are not intended as complete care. It is important that you follow up with a doctor, nurse practitioner, or physician's assistant for ongoing care. If your symptoms worsen or you do not improve as expected and you are unable to reach your usual health care provider, you should return to the Emergency Department. We are available 24 hours a day. Please make an appointment with your health care provider(s) for follow up of your Emergency Department visit. Take this sheet with you when you go to your follow-up visit.

## 2022-05-22 LAB
ATRIAL RATE: 83 BPM
CALCULATED P AXIS, ECG09: 46 DEGREES
CALCULATED R AXIS, ECG10: 78 DEGREES
CALCULATED T AXIS, ECG11: 62 DEGREES
DIAGNOSIS, 93000: NORMAL
P-R INTERVAL, ECG05: 142 MS
Q-T INTERVAL, ECG07: 368 MS
QRS DURATION, ECG06: 92 MS
QTC CALCULATION (BEZET), ECG08: 432 MS
VENTRICULAR RATE, ECG03: 83 BPM

## 2022-05-30 ENCOUNTER — HOSPITAL ENCOUNTER (EMERGENCY)
Age: 32
Discharge: HOME | End: 2022-05-30
Attending: EMERGENCY MEDICINE
Payer: MEDICAID

## 2022-05-30 VITALS
OXYGEN SATURATION: 98 % | HEIGHT: 59 IN | RESPIRATION RATE: 15 BRPM | SYSTOLIC BLOOD PRESSURE: 118 MMHG | WEIGHT: 113 LBS | HEART RATE: 100 BPM | TEMPERATURE: 100.5 F | BODY MASS INDEX: 22.78 KG/M2 | DIASTOLIC BLOOD PRESSURE: 63 MMHG

## 2022-05-30 DIAGNOSIS — J02.9 VIRAL PHARYNGITIS: ICD-10-CM

## 2022-05-30 DIAGNOSIS — B34.9 VIRAL SYNDROME: Primary | ICD-10-CM

## 2022-05-30 LAB — DEPRECATED S PYO AG THROAT QL EIA: NEGATIVE

## 2022-05-30 PROCEDURE — 87070 CULTURE OTHR SPECIMN AEROBIC: CPT

## 2022-05-30 PROCEDURE — 74011250637 HC RX REV CODE- 250/637: Performed by: EMERGENCY MEDICINE

## 2022-05-30 PROCEDURE — 87880 STREP A ASSAY W/OPTIC: CPT

## 2022-05-30 PROCEDURE — 99283 EMERGENCY DEPT VISIT LOW MDM: CPT

## 2022-05-30 PROCEDURE — 87147 CULTURE TYPE IMMUNOLOGIC: CPT

## 2022-05-30 RX ORDER — ACETAMINOPHEN 160 MG/5ML
650 LIQUID ORAL
Qty: 236 ML | Refills: 0 | Status: SHIPPED | OUTPATIENT
Start: 2022-05-30 | End: 2022-06-02

## 2022-05-30 RX ORDER — TRIPROLIDINE/PSEUDOEPHEDRINE 2.5MG-60MG
600 TABLET ORAL
Status: COMPLETED | OUTPATIENT
Start: 2022-05-30 | End: 2022-05-30

## 2022-05-30 RX ORDER — TRIPROLIDINE/PSEUDOEPHEDRINE 2.5MG-60MG
600 TABLET ORAL
Qty: 237 ML | Refills: 0 | Status: SHIPPED | OUTPATIENT
Start: 2022-05-30 | End: 2022-06-02

## 2022-05-30 RX ADMIN — IBUPROFEN 600 MG: 100 SUSPENSION ORAL at 18:50

## 2022-05-30 RX ADMIN — ACETAMINOPHEN ORAL SOLUTION 650 MG: 160 SOLUTION ORAL at 18:50

## 2022-05-30 NOTE — ED TRIAGE NOTES
Pt arrived by POV with complaint of sore throat. Pt reports she has a sore throat with chills no fever. Pt reports she may have strep throat. Pt reports pain is worse when she swallows.   Pt is awake alert and oriented x 4, pt educated on ER flow

## 2022-05-30 NOTE — Clinical Note
[address] [place] [address] EMERGENCY DEP  [address] [place] [address] Dr Murillo Mini [id number]  [phone]    Work/School Note    Date: 5/30/2022    To Whom It May concern:    Marjorie Goff was seen and treated today in the emergency room by the following provider(s):  Attending Provider: Bk Gant MD.      Marjorie Goff is excused from work/school on 05/30/22 and 05/31/22. She is medically clear to return to work/school on 6/1/2022. Sincerely,          Paddy Martinez MD

## 2022-05-30 NOTE — ED NOTES
Bedside shift change report given to [address]  (oncoming nurse) by Noah García RN (offgoing nurse). Report included the following information SBAR, Kardex and ED Summary.

## 2022-05-30 NOTE — ED PROVIDER NOTES
EMERGENCY DEPARTMENT HISTORY AND PHYSICAL EXAM          Date: 5/30/2022  Patient Name: Massiel Manuel    History of Presenting Illness     Chief Complaint   Patient presents with    Sore Throat       History Provided By: Patient    HPI: Massiel Manuel is a 34 y.o. female, pmhx epilepsy, who presents ambulatory to the ED c/o sore throat    Explains she has been taking care of her ill son and this morning she woke up not feeling well. She started with fever and took Motrin but her last dose was early this morning. States she has a cough and her throat hurts extremely bad every time she coughs. She denies any nausea, vomiting, diarrhea as well as shortness of breath. PCP: Xochitl Perez NP    Allergies: Cephalexin  There are no other complaints, changes, or physical findings at this time. Current Facility-Administered Medications   Medication Dose Route Frequency Provider Last Rate Last Admin    acetaminophen (TYLENOL) solution 650 mg  650 mg Oral NOW Termeer, Suellyn Primrose., MD        ibuprofen (ADVIL;MOTRIN) 100 mg/5 mL oral suspension 600 mg  600 mg Oral NOW Termeer, Suellyn Primrose., MD         Current Outpatient Medications   Medication Sig Dispense Refill    acetaminophen (TYLENOL) 160 mg/5 mL liquid Take 20.3 mL by mouth every six (6) hours as needed for Pain for up to 3 days. 236 mL 0    ibuprofen (ADVIL;MOTRIN) 100 mg/5 mL suspension Take 30 mL by mouth four (4) times daily as needed for Fever for up to 3 days. 237 mL 0    Cetirizine (ZyrTEC) 10 mg cap Take  by mouth daily.  ProAir HFA 90 mcg/actuation inhaler INHALE 2 PUFFS BY MOUTH EVERY 4 HOURS AS NEEDED FOR WHEEZING FOR SHORTNESS OF BREATH 9 g 1    fluticasone propionate (FLONASE) 50 mcg/actuation nasal spray 2 Sprays by Both Nostrils route daily. 1 Each 5    montelukast (SINGULAIR) 10 mg tablet Take 1 Tablet by mouth daily.  90 Tablet 3    phenol throat spray (Chloraseptic Throat Spray) 1.4 % spray Take 1 Spray by mouth as needed for Sore throat. (Patient not taking: Reported on 2021) 1 Bottle 1       Past History     Past Medical History:  Past Medical History:   Diagnosis Date    Abnormal Pap smear     Unspecified epilepsy without mention of intractable epilepsy     4 seizures in past 3 mos       Past Surgical History:  Past Surgical History:   Procedure Laterality Date    HX GYN      csection.  HX TUBAL LIGATION      [place] ABDOMEN SURGERY PROC UNLISTED      niessen surgery and PEG tube as child.  AR  DELIVERY ONLY         Family History:  Family History   Problem Relation Age of Onset    Stroke Mother     Cancer Maternal Aunt     Diabetes Maternal Grandmother     Heart Disease Maternal Grandmother     Hypertension Maternal Grandmother     Heart Disease Maternal Grandfather        Social History:  Social History     Tobacco Use    Smoking status: Current Every Day Smoker     Packs/day: 1.00     Years: 3.00     Pack years: 3.00    Smokeless tobacco: Never Used   Vaping Use    Vaping Use: Never used   Substance Use Topics    Alcohol use: No    Drug use: Never       Allergies: Allergies   Allergen Reactions    Cephalexin Swelling         Review of Systems   Review of Systems   Constitutional: Positive for chills. Negative for activity change, appetite change, fever and unexpected weight change. HENT: Positive for sore throat. Negative for congestion. Eyes: Negative for pain and visual disturbance. Respiratory: Negative for cough and shortness of breath. Cardiovascular: Negative for chest pain. Gastrointestinal: Negative for abdominal pain, diarrhea, nausea and vomiting. Genitourinary: Negative for dysuria. Musculoskeletal: Negative for back pain. Skin: Negative for rash. Neurological: Negative for headaches. Physical Exam   Physical Exam  Vitals and nursing note reviewed. Constitutional:       Appearance: She is well-developed. She is not diaphoretic.    HENT:      Head: Normocephalic and atraumatic. Mouth/Throat:      Mouth: Mucous membranes are dry. No oral lesions. Pharynx: Posterior oropharyngeal erythema present. No pharyngeal swelling. Tonsils: No tonsillar exudate or tonsillar abscesses. 0 on the right. 0 on the left. Eyes:      General:         Right eye: No discharge. Left eye: No discharge. Conjunctiva/sclera: Conjunctivae normal.      Pupils: Pupils are equal, round, and reactive to light. Cardiovascular:      Rate and Rhythm: Regular rhythm. Tachycardia present. Heart sounds: Normal heart sounds. No murmur heard. Pulmonary:      Effort: Pulmonary effort is normal. No respiratory distress. Breath sounds: Normal breath sounds. No wheezing or rales. Abdominal:      General: Bowel sounds are normal. There is no distension. Palpations: Abdomen is soft. Tenderness: There is no abdominal tenderness. Musculoskeletal:         General: Normal range of motion. Cervical back: Normal range of motion and neck supple. Skin:     General: Skin is warm and dry. Findings: No rash. Comments: Patient is hot to touch   Neurological:      Mental Status: She is alert and oriented to person, place, and time. Cranial Nerves: No cranial nerve deficit. Motor: No abnormal muscle tone. Diagnostic Study Results     Labs -   No results found for this or any previous visit (from the past 12 hour(s)). Radiologic Studies -   No orders to display     CT Results  (Last 48 hours)    None        CXR Results  (Last 48 hours)    None            Medical Decision Making   I am the first provider for this patient. I reviewed the vital signs, available nursing notes, past medical history, past surgical history, family history and social history. Vital Signs-Reviewed the patient's vital signs.   Patient Vitals for the past 12 hrs:   Temp Pulse Resp BP SpO2   05/30/22 1807 (!) 101.2 °F (38.4 °C) (!) 108 16 124/72 98 %       Pulse Oximetry Analysis - 98% on RA    Records Reviewed: Nursing Notes and Old Medical Records    Provider Notes (Medical Decision Making):   MDM: Tracie Lopez female presenting with fever and sore throat and cough. Low concern for pneumonia, PE, intra-abdominal pathology. Symptomatic medications to be provided for fever. Patient is requesting strep testing. ED Course:   Initial assessment performed. The patients presenting problems have been discussed, and they are in agreement with the care plan formulated and outlined with them. I have encouraged them to ask questions as they arise throughout their visit. PROGRESS NOTE:  7PM  Pt signed out to Dr Jessica Domínguez, testing pending. Critical Care Time:   0      Diagnosis     Clinical Impression:   1. Viral syndrome    2. Pharyngitis, unspecified etiology        PLAN:  1. Current Discharge Medication List      START taking these medications    Details   acetaminophen (TYLENOL) 160 mg/5 mL liquid Take 20.3 mL by mouth every six (6) hours as needed for Pain for up to 3 days. Qty: 236 mL, Refills: 0  Start date: 5/30/2022, End date: 6/2/2022      ibuprofen (ADVIL;MOTRIN) 100 mg/5 mL suspension Take 30 mL by mouth four (4) times daily as needed for Fever for up to 3 days. Qty: 237 mL, Refills: 0  Start date: 5/30/2022, End date: 6/2/2022           2. Follow-up Information     Follow up With Specialties Details Why Contact Liliana Chang NP Nurse Practitioner  As needed [address] [facility] [address] [facility]      [address] [facility] [address] [facility] Emergency Medicine  If symptoms worsen [address] [phone]        Return to ED if worse     Disposition:  Home       Please note, this dictation was completed with [employer], the [employer] voice recognition software. Quite often unanticipated grammatical, syntax, homophones, and other interpretive errors are inadvertently transcribed by the computer software.  Please disregard these errors. Please excuse any errors that have escaped final proof reading.

## 2022-05-30 NOTE — DISCHARGE INSTRUCTIONS
--Tylenol 1000 mg every 6 hours as needed for fever/pain. --Ibuprofen 600 mg every 6 hours as needed for fever/pain. --Follow up with Ms Bryanna Kruse this week if you are not better.

## 2022-06-01 ENCOUNTER — VIRTUAL VISIT (OUTPATIENT)
Dept: FAMILY MEDICINE CLINIC | Age: 32
End: 2022-06-01

## 2022-06-01 DIAGNOSIS — J02.9 SORE THROAT: Primary | ICD-10-CM

## 2022-06-01 LAB
BACTERIA SPEC CULT: NORMAL
SERVICE CMNT-IMP: NORMAL

## 2022-06-01 PROCEDURE — 99442 PR PHYS/QHP TELEPHONE EVALUATION 11-20 MIN: CPT

## 2022-06-01 RX ORDER — PENICILLIN V POTASSIUM 250 MG/5ML
500 POWDER, FOR SOLUTION ORAL 4 TIMES DAILY
Qty: 280 ML | Refills: 0 | Status: SHIPPED | OUTPATIENT
Start: 2022-06-01 | End: 2022-08-15 | Stop reason: SDUPTHER

## 2022-06-01 RX ORDER — LIDOCAINE HYDROCHLORIDE 20 MG/ML
15 SOLUTION OROPHARYNGEAL AS NEEDED
Qty: 100 ML | Refills: 0 | Status: SHIPPED | OUTPATIENT
Start: 2022-06-01 | End: 2022-09-18

## 2022-06-01 NOTE — PROGRESS NOTES
Grace Hubbard (: 1990) is a 34 y.o. female, established patient, here for evaluation of the following chief complaint(s):   Hospital Follow Up (ED, [facility] BS), Sore Throat, Fever (on 2022 - 101.2), and Chills (off and on)       ASSESSMENT/PLAN:  Below is the assessment and plan developed based on review of pertinent history, labs, studies, and medications. 1. Sore throat  -     lidocaine (XYLOCAINE) 2 % solution; Take 15 mL by mouth as needed for Pain., Normal, Disp-100 mL, R-0    Continue symptomatic treatment pending throat culture. Use lidocaine gargle as needed for pain, ibuprofen and APAP for pain and fever. Return if symptoms worsen or fail to improve. SUBJECTIVE/OBJECTIVE:  Grace Hubbard endorses sore throat, chills, and fever onset about 4 days ago; she was seen in the ED at [facility] on  with a negative rapid strep screen (throat culture is still pending). She has been using ibuprofen and APAP with relief of her symptoms; she notes TMax 99.1 at home, but has chills and sweats. She denies other symptoms, including nasal congestion, ear pain, cough, SOB, CP. Review of Systems   Constitutional: Positive for chills and diaphoresis. Negative for fatigue and fever. HENT: Negative for congestion, sinus pressure and sore throat. Respiratory: Negative for cough, shortness of breath and wheezing. Cardiovascular: Negative for chest pain and palpitations. Gastrointestinal: Negative. Skin: Negative. Negative for rash. Neurological: Negative. Negative for dizziness and headaches. Patient-Reported Temperature: 99.1 (22)     On this date 2022 I have spent 15 minutes reviewing previous notes, test results and face to face (virtual) with the patient discussing the diagnosis and importance of compliance with the treatment plan as well as documenting on the day of the visit. Grace Hubbard, was evaluated through a synchronous (real-time) audio only encounter.  The patient (or guardian if applicable) is aware that this is a billable service, which includes applicable co-pays. This Virtual Visit was conducted with patient's (and/or legal guardian's) consent. The visit was conducted pursuant to the emergency declaration under the [facility], [address] [place] [address] authority and the [employer] Act. Patient identification was verified, and a caregiver was present when appropriate. The patient was located at: Home: [address] [place] [address]  The provider was located at: [facility] [address] [id number]       An electronic signature was used to authenticate this note.   -- Zev Garibay NP

## 2022-06-01 NOTE — PROGRESS NOTES
1. \"Have you been to the ER, urgent care clinic since your last visit? ER [facility] 05/30/2022 Hospitalized since your last visit? \" No    2. \"Have you seen or consulted any other health care providers outside of the [address] [place] [address] since your last visit? \" No     3. For patients aged 39-70: Has the patient had a colonoscopy / FIT/ Cologuard? NA - based on age      If the patient is female:    4. For patients aged 41-77: Has the patient had a mammogram within the past 2 years? NA - based on age or sex      11. For patients aged 21-65: Has the patient had a pap smear?  Yes, Care Gap Present

## 2022-06-01 NOTE — PATIENT INSTRUCTIONS
Sore Throat: Care Instructions  Overview     Infection by bacteria or a virus causes most sore throats. Cigarette smoke, dry air, air pollution, allergies, and yelling can also cause a sore throat. Sore throats can be painful and annoying. Fortunately, most sore throats go away on their own. If you have a bacterial infection, your doctor may prescribe antibiotics. Follow-up care is a key part of your treatment and safety. Be sure to make and go to all appointments, and call your doctor if you are having problems. It's also a good idea to know your test results and keep a list of the medicines you take. How can you care for yourself at home? · If your doctor prescribed antibiotics, take them as directed. Do not stop taking them just because you feel better. You need to take the full course of antibiotics. · Gargle with warm salt water several times a day to help reduce swelling and relieve pain. Mix 1/2 teaspoon of salt in 1 cup of warm water. · Take an over-the-counter pain medicine, such as acetaminophen (Tylenol), ibuprofen (Advil, Motrin), or naproxen (Aleve). Read and follow all instructions on the label. · Be careful when taking over-the-counter cold or flu medicines and Tylenol at the same time. Many of these medicines have acetaminophen, which is Tylenol. Read the labels to make sure that you are not taking more than the recommended dose. Too much acetaminophen (Tylenol) can be harmful. · Drink plenty of fluids. Fluids may help soothe an irritated throat. Hot fluids, such as tea or soup, may help decrease throat pain. · Use over-the-counter throat lozenges to soothe pain. Regular cough drops or hard candy may also help. These should not be given to young children because of the risk of choking. · Do not smoke or allow others to smoke around you. If you need help quitting, talk to your doctor about stop-smoking programs and medicines. These can increase your chances of quitting for good.   · Use a vaporizer or humidifier to add moisture to your bedroom. Follow the directions for cleaning the machine. When should you call for help? Call your doctor now or seek immediate medical care if:    · You have trouble breathing.     · Your sore throat gets much worse on one side.     · You have new or worse trouble swallowing.     · You have a new or higher fever. Watch closely for changes in your health, and be sure to contact your doctor if you do not get better as expected. Where can you learn more? Go to http://www.gray.com/  Enter U420 in the search box to learn more about \"Sore Throat: Care Instructions. \"  Current as of: September 8, 2021               Content Version: 13.2  © 2006-2022 [employer]. Care instructions adapted under license by [facility] (which disclaims liability or warranty for this information). If you have questions about a medical condition or this instruction, always ask your healthcare professional. [place] [address] any warranty or liability for your use of this information.

## 2022-08-15 ENCOUNTER — OFFICE VISIT (OUTPATIENT)
Dept: FAMILY MEDICINE CLINIC | Age: 32
End: 2022-08-15
Payer: MEDICAID

## 2022-08-15 DIAGNOSIS — K04.7 ABSCESSED TOOTH: ICD-10-CM

## 2022-08-15 DIAGNOSIS — F43.9 STRESS: ICD-10-CM

## 2022-08-15 DIAGNOSIS — H65.02 NON-RECURRENT ACUTE SEROUS OTITIS MEDIA OF LEFT EAR: Primary | ICD-10-CM

## 2022-08-15 PROCEDURE — 99214 OFFICE O/P EST MOD 30 MIN: CPT | Performed by: NURSE PRACTITIONER

## 2022-08-15 RX ORDER — PENICILLIN V POTASSIUM 250 MG/5ML
500 POWDER, FOR SOLUTION ORAL 4 TIMES DAILY
Qty: 280 ML | Refills: 0 | Status: SHIPPED | OUTPATIENT
Start: 2022-08-15 | End: 2022-09-12 | Stop reason: SDUPTHER

## 2022-08-15 RX ORDER — SERTRALINE HYDROCHLORIDE 25 MG/1
25 TABLET, FILM COATED ORAL DAILY
Qty: 30 TABLET | Refills: 2 | Status: SHIPPED | OUTPATIENT
Start: 2022-08-15 | End: 2022-09-18

## 2022-08-15 NOTE — PROGRESS NOTES
1. \"Have you been to the ER, urgent care clinic since your last visit? Hospitalized since your last visit? \" No    2. \"Have you seen or consulted any other health care providers outside of the [address] [place] [address] since your last visit? \" No     3. For patients aged 39-70: Has the patient had a colonoscopy / FIT/ Cologuard? If the patient is female:    4. For patients aged 41-77: Has the patient had a mammogram within the past 2 years? Yes - no Care Gap present      5. For patients aged 21-65: Has the patient had a pap smear?  Yes - no Care Gap present

## 2022-08-16 VITALS
BODY MASS INDEX: 22.98 KG/M2 | WEIGHT: 114 LBS | OXYGEN SATURATION: 100 % | HEART RATE: 70 BPM | RESPIRATION RATE: 16 BRPM | DIASTOLIC BLOOD PRESSURE: 70 MMHG | TEMPERATURE: 97.4 F | SYSTOLIC BLOOD PRESSURE: 110 MMHG | HEIGHT: 59 IN

## 2022-08-16 NOTE — PROGRESS NOTES
Subjective:     Danish Torres is a 34 y.o. female who presents today with the following:  Chief Complaint   Patient presents with    Stress    Dental Pain    Ear Pain     Left side. Same side as tooth ache . Face swollen on the left side. Patient Active Problem List   Diagnosis Code    Seizure disorder in pregnancy ([facility] [address] O99.350, G40.909    Wellness examination Z00.00    Mild intermittent asthma without complication O57.33    Infected nailbed of toe L03.039    Pregnancy complicated by fetal skeletal dysplasia O35. 8XX0         COMPLIANT WITH MEDICATION:   Denies chest pain, dyspnea, palpitations, headache and blurred vision. Blood pressure normotensiv  Dental pain ; She endorses having poor dental health and was told she needed to have all of her teeth pulled. . Seen at the dental clinic at [facility]. 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 At present he needs to be cathed every 2 hours including during the night. Current smoker. No desire to quit at this time. depression screening addressed not at risk    abuse screening addressed denies    learning assessment addressed reviewed nurses notes    fall risk addressed not at risk    HM: addressed active infection tooth abscess revisit at next OV. ROS:  Gen: denies fever, chills, fatigue, weight loss, weight gain  HEENT:denies blurry vision, nasal congestion, sore throat  Resp: denies dypsnea, cough, wheezing  CV: denies chest pain radiating to the jaws or arms, palpitations, lower extremity edema  Abd: denies nausea, vomiting, diarrhea, constipation  Neuro: denies numbness/tingling  Endo: denies polyuria, polydipsia, heat/cold intolerance  Heme: no lymphadenopathy    Allergies   Allergen Reactions    Cephalexin Swelling         Current Outpatient Medications:     penicillin v potassium (VEETID) 250 mg/5 mL suspension, Take 10 mL by mouth four (4) times daily for 7 days. , Disp: 280 mL, Rfl: 0    sertraline (ZOLOFT) 25 mg tablet, Take 1 Tablet by mouth in the morning., Disp: 30 Tablet, Rfl: 2    ProAir HFA 90 mcg/actuation inhaler, INHALE 2 PUFFS BY MOUTH EVERY 4 HOURS AS NEEDED FOR WHEEZING FOR SHORTNESS OF BREATH, Disp: 9 g, Rfl: 1    lidocaine (XYLOCAINE) 2 % solution, Take 15 mL by mouth as needed for Pain. (Patient not taking: Reported on 8/15/2022), Disp: 100 mL, Rfl: 0    Cetirizine (ZyrTEC) 10 mg cap, Take  by mouth daily. (Patient not taking: Reported on 8/15/2022), Disp: , Rfl:     fluticasone propionate (FLONASE) 50 mcg/actuation nasal spray, 2 Sprays by Both Nostrils route daily. (Patient not taking: Reported on 8/15/2022), Disp: 1 Each, Rfl: 5    montelukast (SINGULAIR) 10 mg tablet, Take 1 Tablet by mouth daily. (Patient not taking: Reported on 8/15/2022), Disp: 90 Tablet, Rfl: 3    phenol throat spray (Chloraseptic Throat Spray) 1.4 % spray, Take 1 Spray by mouth as needed for Sore throat. (Patient not taking: Reported on 8/15/2022), Disp: 1 Bottle, Rfl: 1    Past Medical History:   Diagnosis Date    Abnormal Pap smear     Unspecified epilepsy without mention of intractable epilepsy     4 seizures in past 3 mos       Past Surgical History:   Procedure Laterality Date    HX GYN      csection. HX TUBAL LIGATION      [place] ABDOMEN SURGERY PROC UNLISTED      niessen surgery and PEG tube as child.     [place]  DELIVERY ONLY         Social History     Tobacco Use   Smoking Status Every Day    Packs/day: 1.00    Years: 3.00    Pack years: 3.00    Types: Cigarettes   Smokeless Tobacco Never       Social History     Socioeconomic History    Marital status:    Tobacco Use    Smoking status: Every Day     Packs/day: 1.00     Years: 3.00     Pack years: 3.00     Types: Cigarettes    Smokeless tobacco: Never   Vaping Use    Vaping Use: Never used   Substance and Sexual Activity    Alcohol use: No    Drug use: Never    Sexual activity: Yes     Partners: Male     Birth control/protection: Pill, Condom, Surgical   Other Topics Concern     Service No    Blood Transfusions Yes    Caffeine Concern Yes    Occupational Exposure No    Hobby Hazards No    Sleep Concern No    Stress Concern No    Weight Concern Yes    Special Diet No    Back Care Yes    Exercise Yes    Seat Belt Yes    Self-Exams Yes     Social Determinants of Health     Financial Resource Strain: Low Risk     Difficulty of Paying Living Expenses: Not hard at all   Food Insecurity: No Food Insecurity    Worried About Running Out of Food in the Last Year: Never true    Ran Out of Food in the Last Year: Never true   Transportation Needs: No Transportation Needs    Lack of Transportation (Medical): No    Lack of Transportation (Non-Medical): No       Family History   Problem Relation Age of Onset    Stroke Mother     Cancer Maternal Aunt     Diabetes Maternal Grandmother     Heart Disease Maternal Grandmother     Hypertension Maternal Grandmother     Heart Disease Maternal Grandfather          Objective:     Visit Vitals  /70 (BP 1 Location: Right upper arm, BP Patient Position: Sitting, BP Cuff Size: Adult)   Pulse 70   Temp 97.4 °F (36.3 °C) (Temporal)   Ht 4' 11\" (1.499 m)   Wt 114 lb (51.7 kg)   LMP 07/30/2022 (Approximate)   SpO2 100%   BMI 23.03 kg/m²     Body mass index is 23.03 kg/m². General: Alert and oriented. Moderate stress distress. Well nourished  HEENT :  Ears: Left TM white exudate and erythematous , Right TM is  normal. Canal is clear. Eyes: pupils equal, round, react to light and accommodation. Extra ocular movements intact. Nose: patent. Mouth and throat is clear. #20 tooth and surrounding gum inflamed,  edematous with white exudate . Neck:supple full range of motion no thyromegaly. Trachea midline, No carotid bruits. No significant lymphadenopathy  Lungs[de-identified] clear to auscultation without wheezes, rales, or rhonchi. Heart :RRR, S1 & S2 are normal intensity.  No murmur; no gallop  Extremities: without clubbing, cyanosis, or edema  Pulses: radial and femoral pulses are normal  Neuro: HMF intact. Cranial nerves II through XII grossly normal.  Motor: is 5 over 5 and symmetrical.   Deep tendon reflexes: +2 equal  Psych:appropriate behavior, mood, affect and judgement. Pressured speech  Results for orders placed or performed during the hospital encounter of 05/30/22   STREP AG SCREEN, GROUP A    Specimen: Swab; Throat   Result Value Ref Range    Group A Strep Ag ID Negative NEG     CULTURE, THROAT    Specimen: Throat Swab   Result Value Ref Range    Special Requests: NO SPECIAL REQUESTS      Culture result: MODERATE STREPTOCOCCI, BETA HEMOLYTIC GROUP A      Culture result: MODERATE NORMAL RESPIRATORY GABRIELA      Culture result:       (NOTE) [facility] NOTIFIED DR Manuel Wisdom ON 6/1/22 AT 1405.        No results found for this visit on 08/15/22. Assessment/ Plan:     1. Abscessed tooth  PCN as noted below. Salt water rinses 4 x per day  Tylenol for discomfort 1 to 2 tablets po every 6 hours as needed for discomfort. Seek dental care. Encouraged to contact her  to assist with appointment and assistance at home. 2. Stress  Start Zoloft as noted below. Patient education provided for relaxation techniques and stress reduction    3. Non-recurrent acute serous otitis media of left ear  PCN as noted below. Orders Placed This Encounter    penicillin v potassium (VEETID) 250 mg/5 mL suspension     Sig: Take 10 mL by mouth four (4) times daily for 7 days. Dispense:  280 mL     Refill:  0     Pt requests flavoring  would like to avoid cherry . Prefers grape if available . sertraline (ZOLOFT) 25 mg tablet     Sig: Take 1 Tablet by mouth in the morning. Dispense:  30 Tablet     Refill:  2         Verbal and written instructions (see AVS) provided. Patient expresses understanding of diagnosis and treatment plan.     Health Maintenance Due   Topic Date Due    Hepatitis C Screening  Never done    Pneumococcal 0-64 years (2 - PCV) 01/31/2021               CARLOS Hernandez

## 2022-08-16 NOTE — ACP (ADVANCE CARE PLANNING)
Discussed importance of advanced medical directives with patient. Patient is capable of making decisions.  Chadd Sands NP-C

## 2022-09-12 ENCOUNTER — OFFICE VISIT (OUTPATIENT)
Dept: FAMILY MEDICINE CLINIC | Age: 32
End: 2022-09-12
Payer: MEDICAID

## 2022-09-12 VITALS
RESPIRATION RATE: 20 BRPM | WEIGHT: 113.8 LBS | SYSTOLIC BLOOD PRESSURE: 120 MMHG | HEART RATE: 80 BPM | HEIGHT: 59 IN | OXYGEN SATURATION: 98 % | BODY MASS INDEX: 22.94 KG/M2 | TEMPERATURE: 98.7 F | DIASTOLIC BLOOD PRESSURE: 72 MMHG

## 2022-09-12 DIAGNOSIS — K05.10 GUM INFLAMMATION: ICD-10-CM

## 2022-09-12 DIAGNOSIS — F43.9 STRESS: Primary | ICD-10-CM

## 2022-09-12 DIAGNOSIS — K02.9 TOOTH DECAY: ICD-10-CM

## 2022-09-12 PROCEDURE — 99213 OFFICE O/P EST LOW 20 MIN: CPT | Performed by: NURSE PRACTITIONER

## 2022-09-12 RX ORDER — PENICILLIN V POTASSIUM 250 MG/5ML
500 POWDER, FOR SOLUTION ORAL 4 TIMES DAILY
Qty: 400 ML | Refills: 0 | Status: SHIPPED | OUTPATIENT
Start: 2022-09-12 | End: 2022-09-22

## 2022-09-18 NOTE — PROGRESS NOTES
Subjective:     Danish Torres is a 34 y.o. female who presents today with the following:  Chief Complaint   Patient presents with    Follow-up     Taste of blood in mouth       Patient Active Problem List   Diagnosis Code    Seizure disorder in pregnancy ([facility] O99.350, G40.909    Wellness examination Z00.00    Mild intermittent asthma without complication T43.74    Infected nailbed of toe L03.039    Pregnancy complicated by fetal skeletal dysplasia O35. 8XX0         COMPLIANT WITH MEDICATION:    Denies chest pain, dyspnea, palpitations, headache and blurred vision. Blood pressure normotensive. Anxiety due to life stresses. Unable to tolerate zoloft -heart racing . Life stresses are decreasing . We discussed online counseling., therapy. Tooth decay; Several rotten teeth and swollen gums. She has an appointment with dentist on the 21st of September. depression screening addressed not at risk    abuse screening addressed denies    learning assessment addressed reviewed nurses notes    fall risk addressed not at risk    HM: addressed    ROS:  Gen: denies fever, chills, fatigue, weight loss, weight gain  HEENT:denies blurry vision, nasal congestion, sore throat  Resp: denies dypsnea, cough, wheezing  CV: denies chest pain radiating to the jaws or arms, palpitations, lower extremity edema  Abd: denies nausea, vomiting, diarrhea, constipation  Neuro: denies numbness/tingling  Endo: denies polyuria, polydipsia, heat/cold intolerance  Heme: no lymphadenopathy    Allergies   Allergen Reactions    Cephalexin Swelling         Current Outpatient Medications:     penicillin v potassium (VEETID) 250 mg/5 mL suspension, Take 10 mL by mouth four (4) times daily for 10 days. Indications: abscess gums and teeth, Disp: 400 mL, Rfl: 0    Cetirizine (ZyrTEC) 10 mg cap, Take  by mouth daily. , Disp: , Rfl:     ProAir HFA 90 mcg/actuation inhaler, INHALE 2 PUFFS BY MOUTH EVERY 4 HOURS AS NEEDED FOR WHEEZING FOR SHORTNESS OF BREATH, Disp: 9 g, Rfl: 1    fluticasone propionate (FLONASE) 50 mcg/actuation nasal spray, 2 Sprays by Both Nostrils route daily. , Disp: 1 Each, Rfl: 5    montelukast (SINGULAIR) 10 mg tablet, Take 1 Tablet by mouth daily. , Disp: 90 Tablet, Rfl: 3    Past Medical History:   Diagnosis Date    Abnormal Pap smear     Unspecified epilepsy without mention of intractable epilepsy     4 seizures in past 3 mos       Past Surgical History:   Procedure Laterality Date    HX GYN      csection. HX TUBAL LIGATION      [place] ABDOM[place] SURGERY PROC UNLISTED      niessen surgery and PEG tube as child.     [place]  DELIVERY ONLY         Social History     Tobacco Use   Smoking Status Every Day    Packs/day: 1.00    Years: 3.00    Pack years: 3.00    Types: Cigarettes   Smokeless Tobacco Never       Social History     Socioeconomic History    Marital status:    Tobacco Use    Smoking status: Every Day     Packs/day: 1.00     Years: 3.00     Pack years: 3.00     Types: Cigarettes    Smokeless tobacco: Never   Vaping Use    Vaping Use: Never used   Substance and Sexual Activity    Alcohol use: No    Drug use: Never    Sexual activity: Yes     Partners: Male     Birth control/protection: Pill, Condom, Surgical   Other Topics Concern     Service No    Blood Transfusions Yes    Caffeine Concern Yes    Occupational Exposure No    Hobby Hazards No    Sleep Concern No    Stress Concern No    Weight Concern Yes    Special Diet No    Back Care Yes    Exercise Yes    Seat Belt Yes    Self-Exams Yes     Social Determinants of Health     Financial Resource Strain: Low Risk     Difficulty of Paying Living Expenses: Not hard at all   Food Insecurity: No Food Insecurity    Worried About Running Out of Food in the Last Year: Never true    Ran Out of Food in the Last Year: Never true   Transportation Needs: No Transportation Needs    Lack of Transportation (Medical): No    Lack of Transportation (Non-Medical): No       Family History   Problem Relation Age of Onset    Stroke Mother     Cancer Maternal Aunt     Diabetes Maternal Grandmother     Heart Disease Maternal Grandmother     Hypertension Maternal Grandmother     Heart Disease Maternal Grandfather          Objective:     Visit Vitals  /72 (BP 1 Location: Right arm, BP Patient Position: Sitting, BP Cuff Size: Adult)   Pulse 80   Temp 98.7 °F (37.1 °C) (Temporal)   Resp 20   Ht 4' 11\" (1.499 m)   Wt 113 lb 12.8 oz (51.6 kg)   SpO2 98%   BMI 22.98 kg/m²     Body mass index is 22.98 kg/m². General: Alert and oriented. No acute distress. Well nourished  HEENT :  Ears:TMs are normal. Canals are clear. Eyes: pupils equal, round, react to light and accommodation. Extra ocular movements intact. Nose: patent. Mouth and throat is clear. Several rotten teeth and swollen gums upper and lower. Neck:supple full range of motion no thyromegaly. Trachea midline, No carotid bruits. No significant lymphadenopathy  Lungs[de-identified] clear to auscultation without wheezes, rales, or rhonchi. Heart :RRR, S1 & S2 are normal intensity. No murmur; no gallop  Abdomen: bowel sounds active. No tenderness, guarding, rebound, masses, hepatic or spleen enlargement  Back: no CVA tenderness. Extremities: without clubbing, cyanosis, or edema  Pulses: radial and femoral pulses are normal  Neuro: HMF intact. Cranial nerves II through XII grossly normal.  Motor: is 5 over 5 and symmetrical.   Deep tendon reflexes: +2 equal  Psych:appropriate behavior, mood, affect and judgement.    Results for orders placed or performed during the hospital encounter of 05/30/22   STREP AG SCREEN, GROUP A    Specimen: Swab; Throat   Result Value Ref Range    Group A Strep Ag ID Negative NEG     CULTURE, THROAT    Specimen: Throat Swab   Result Value Ref Range    Special Requests: NO SPECIAL REQUESTS      Culture result: MODERATE STREPTOCOCCI, BETA HEMOLYTIC GROUP A      Culture result: MODERATE NORMAL RESPIRATORY GABRIELA      Culture result:       (NOTE) [facility] NOTIFIED DR Jose Carlos Trevino ON 6/1/22 AT 1405.        No results found for this visit on 09/12/22. Assessment/ Plan:   1. Stress  Provided contact information for [facility] online counseling    2. Tooth decay  Rotten teeth - Penicillin as prescribed below. 3. Gum inflammation    Orders Placed This Encounter    penicillin v potassium (VEETID) 250 mg/5 mL suspension     Sig: Take 10 mL by mouth four (4) times daily for 10 days. Indications: abscess gums and teeth     Dispense:  400 mL     Refill:  0     Pt requests flavoring  would like to avoid cherry . Prefers grape if available . Verbal and written instructions (see AVS) provided. Patient expresses understanding of diagnosis and treatment plan.     Health Maintenance Due   Topic Date Due    Hepatitis C Screening  Never done    Pneumococcal 0-64 years (2 - PCV) 01/31/2021    Flu Vaccine (1) 09/01/2022               CARLOS Theodore

## 2022-09-18 NOTE — ACP (ADVANCE CARE PLANNING)
Discussed importance of advanced medical directives with patient. Patient is capable of making decisions.  Karlene Berrios NP-C

## 2022-10-07 ENCOUNTER — HOSPITAL ENCOUNTER (EMERGENCY)
Age: 32
Discharge: HOME | End: 2022-10-07
Attending: EMERGENCY MEDICINE
Payer: MEDICAID

## 2022-10-07 VITALS
HEART RATE: 86 BPM | DIASTOLIC BLOOD PRESSURE: 87 MMHG | OXYGEN SATURATION: 97 % | SYSTOLIC BLOOD PRESSURE: 124 MMHG | BODY MASS INDEX: 22.78 KG/M2 | WEIGHT: 113 LBS | TEMPERATURE: 98.5 F | RESPIRATION RATE: 18 BRPM | HEIGHT: 59 IN

## 2022-10-07 DIAGNOSIS — K08.409 S/P TOOTH EXTRACTION: Primary | ICD-10-CM

## 2022-10-07 PROCEDURE — 99282 EMERGENCY DEPT VISIT SF MDM: CPT

## 2022-10-07 RX ORDER — CHLORHEXIDINE GLUCONATE 1.2 MG/ML
15 RINSE ORAL EVERY 12 HOURS
Qty: 420 ML | Refills: 0 | Status: SHIPPED | OUTPATIENT
Start: 2022-10-07 | End: 2022-10-21

## 2022-10-07 NOTE — ED PROVIDER NOTES
EMERGENCY DEPARTMENT HISTORY AND PHYSICAL EXAM        Date: 10/7/2022  Patient Name: Tiny Segal    History of Presenting Illness     Chief Complaint   Patient presents with    Dental Problem       History Provided By: Patient    HPI: Tiny Segal, 34 y.o. female with no significant pmh , presents ambulatory to the ED with cc of dental concern. Patient reports she had 8 left upper teeth removed at [facility] on September 29. She was placed on clindamycin and she has been doing well. Today well looking in her mouth she thought there was a small pocket adjacent to her prior second molar area and this worried her. She attempted to contact [facility] multiple times the past 2 days and has not had a return phone call. She denies any pain. She has been tolerating her soft diet well. There is wounds of closed she denies any bleeding or discharge. She has not needed anything for pain in the past several days. She is finishing her antibiotics in a couple days and she wanted to be sure that she did not need more antibiotic. PMHx: No pertinent past medical history  PSHx: No pertinent surgical history. Social Hx: non contributory    There are no other complaints, changes, or physical findings at this time. PCP: Antonia Reynolds NP    No current facility-administered medications on file prior to encounter. Current Outpatient Medications on File Prior to Encounter   Medication Sig Dispense Refill    Cetirizine (ZyrTEC) 10 mg cap Take  by mouth daily. ProAir HFA 90 mcg/actuation inhaler INHALE 2 PUFFS BY MOUTH EVERY 4 HOURS AS NEEDED FOR WHEEZING FOR SHORTNESS OF BREATH 9 g 1    fluticasone propionate (FLONASE) 50 mcg/actuation nasal spray 2 Sprays by Both Nostrils route daily. 1 Each 5    montelukast (SINGULAIR) 10 mg tablet Take 1 Tablet by mouth daily.  90 Tablet 3       Past History     Past Medical History:  Past Medical History:   Diagnosis Date    Abnormal Pap smear 2010    Unspecified epilepsy without mention of intractable epilepsy     4 seizures in past 3 mos       Past Surgical History:  Past Surgical History:   Procedure Laterality Date    HX GYN      csection. HX TUBAL LIGATION      [place] ABDOMEN SURGERY PROC UNLISTED      niessen surgery and PEG tube as child. [place]  DELIVERY ONLY  -       Family History:  Family History   Problem Relation Age of Onset    Stroke Mother     Cancer Maternal Aunt     Diabetes Maternal Grandmother     Heart Disease Maternal Grandmother     Hypertension Maternal Grandmother     Heart Disease Maternal Grandfather        Social History:  Social History     Tobacco Use    Smoking status: Every Day     Packs/day: 1.00     Years: 3.00     Pack years: 3.00     Types: Cigarettes    Smokeless tobacco: Never   Vaping Use    Vaping Use: Never used   Substance Use Topics    Alcohol use: No    Drug use: Never       Allergies: Allergies   Allergen Reactions    Zoloft [Sertraline] Other (comments)     tachycardia    Cephalexin Swelling         Review of Systems   Review of Systems   Constitutional:  Negative for activity change, chills and fever. HENT:  Positive for dental problem. Negative for congestion and sore throat. Eyes:  Negative for pain and redness. Respiratory:  Negative for cough, chest tightness and shortness of breath. Cardiovascular:  Negative for chest pain and palpitations. Gastrointestinal:  Negative for abdominal pain, diarrhea, nausea and vomiting. Genitourinary:  Negative for dysuria, frequency and urgency. Musculoskeletal:  Negative for back pain and neck pain. Skin:  Negative for rash. Neurological:  Negative for syncope, light-headedness and headaches. Psychiatric/Behavioral:  Negative for confusion. All other systems reviewed and are negative. Physical Exam   Physical Exam  Constitutional:       General: She is not in acute distress. Appearance: She is well-developed. She is not diaphoretic.    HENT: Head: Normocephalic and atraumatic. Mouth/Throat:      Comments: Left teeth 9 through 16 surgically absent. Sutures intact. Wound closed. No drainage. No gingival erythema or swelling. Eyes:      General: No scleral icterus. Conjunctiva/sclera: Conjunctivae normal.      Pupils: Pupils are equal, round, and reactive to light. Pulmonary:      Effort: Pulmonary effort is normal.   Musculoskeletal:         General: Normal range of motion. Skin:     General: Skin is warm and dry. Findings: No rash. Neurological:      Mental Status: She is alert and oriented to person, place, and time. Psychiatric:         Behavior: Behavior normal.           Diagnostic Study Results     Labs -   No results found for this or any previous visit (from the past 12 hour(s)). Radiologic Studies -   No orders to display     CT Results  (Last 48 hours)      None          CXR Results  (Last 48 hours)      None              Medical Decision Making   I am the first provider for this patient. I reviewed the vital signs, available nursing notes, past medical history, past surgical history, family history and social history. Vital Signs-Reviewed the patient's vital signs. Patient Vitals for the past 12 hrs:   Temp Pulse Resp BP SpO2   10/07/22 1629 98.5 °F (36.9 °C) 86 18 124/87 97 %           Records Reviewed: Nursing notes reviewed    Provider Notes (Medical Decision Making):   DDX: Dry socket, dental infection, dental abscess, routine postop wound healing    ED Course:   Initial assessment performed. The patients presenting problems have been discussed, and they are in agreement with the care plan formulated and outlined with them. I have encouraged them to ask questions as they arise throughout their visit. PROGRESS NOTE    No signs of infection. Extraction sites healing well. Discharge home. Written by Romana Bombard, MD     Progress note:    Pt ready for discharge.  Updated family on all findings. Will follow up as instructed. All questions have been answered, family voiced understanding and agreement with plan. Specific return precautions provided as well as instructions to return to the ED should sx worsen at any time. Vital signs stable for discharge. Written by Magdalena Black MD        Critical Care Time:   0    Disposition:  Discharge    PLAN:  1. Current Discharge Medication List        START taking these medications    Details   chlorhexidine (Peridex) 0.12 % solution 15 mL by Swish and Spit route every twelve (12) hours for 14 days. Qty: 420 mL, Refills: 0  Start date: 10/7/2022, End date: 10/21/2022           2. Follow-up Information       Follow up With Specialties Details Why Contact Info    Carmelita Cummings NP Nurse Practitioner Schedule an appointment as soon as possible for a visit in 1 week  [place] [address]  [address]      [address] [facility] [address]  Emergency Medicine   [facility]  [facility] [address]  [phone]          Return to ED if worse     Diagnosis     Clinical Impression:   1. S/P tooth extraction              Please note that this dictation was completed with YouTab, the computer voice recognition software. Quite often unanticipated grammatical, syntax, homophones, and other interpretive errors are inadvertently transcribed by the computer software. Please disregard these errors. Please excuse any errors that have escaped final proofreading.

## 2022-10-07 NOTE — ED TRIAGE NOTES
Pt arrived by POV for dental swelling. Per pt on 09/29/22 she had teeth extracted in her upper left and was on antibiotics but now pt reports she has swelling and is concerned she may have an abscess. Pt is awake alert and oriented x 4, pt educated on ER flow.

## 2022-10-11 ENCOUNTER — HOSPITAL ENCOUNTER (EMERGENCY)
Age: 32
Discharge: HOME | End: 2022-10-11
Attending: EMERGENCY MEDICINE
Payer: MEDICAID

## 2022-10-11 ENCOUNTER — TELEPHONE (OUTPATIENT)
Dept: FAMILY MEDICINE CLINIC | Age: 32
End: 2022-10-11

## 2022-10-11 ENCOUNTER — VIRTUAL VISIT (OUTPATIENT)
Dept: FAMILY MEDICINE CLINIC | Age: 32
End: 2022-10-11

## 2022-10-11 VITALS
WEIGHT: 113 LBS | RESPIRATION RATE: 18 BRPM | OXYGEN SATURATION: 98 % | BODY MASS INDEX: 22.82 KG/M2 | SYSTOLIC BLOOD PRESSURE: 127 MMHG | TEMPERATURE: 97.9 F | HEART RATE: 91 BPM | DIASTOLIC BLOOD PRESSURE: 72 MMHG

## 2022-10-11 DIAGNOSIS — J02.0 ACUTE STREPTOCOCCAL PHARYNGITIS: ICD-10-CM

## 2022-10-11 DIAGNOSIS — R00.2 PALPITATIONS: ICD-10-CM

## 2022-10-11 DIAGNOSIS — K08.89 PAIN, DENTAL: Primary | ICD-10-CM

## 2022-10-11 LAB — DEPRECATED S PYO AG THROAT QL EIA: POSITIVE

## 2022-10-11 PROCEDURE — 87880 STREP A ASSAY W/OPTIC: CPT

## 2022-10-11 PROCEDURE — 99284 EMERGENCY DEPT VISIT MOD MDM: CPT

## 2022-10-11 RX ORDER — IBUPROFEN 600 MG/1
600 TABLET ORAL
Qty: 20 TABLET | Refills: 0 | Status: SHIPPED | OUTPATIENT
Start: 2022-10-11

## 2022-10-11 RX ORDER — CLINDAMYCIN HYDROCHLORIDE 300 MG/1
300 CAPSULE ORAL 3 TIMES DAILY
Qty: 30 CAPSULE | Refills: 0 | Status: SHIPPED | OUTPATIENT
Start: 2022-10-11 | End: 2022-10-21

## 2022-10-11 NOTE — ED TRIAGE NOTES
Pt has concerns over sore throat, recent tooth removal, and racing heart beat. Called PCP and they told her to come to ED. Pt in no obvious signs of distress.

## 2022-10-11 NOTE — PROGRESS NOTES
1. \"Have you been to the ER, urgent care clinic since your last visit? Hospitalized since your last visit? \" No    2. \"Have you seen or consulted any other health care providers outside of the [address] [place] [address] since your last visit? \" No     3. For patients aged 39-70: Has the patient had a colonoscopy / FIT/ Cologuard? NA - based on age      If the patient is female:    4. For patients aged 41-77: Has the patient had a mammogram within the past 2 years? NA - based on age or sex      11. For patients aged 21-65: Has the patient had a pap smear? NA - based on age or sex    Chief Complaint   Patient presents with    Sore Throat     Patient had 8 teeth pulled in Early Sept.  She  has being calling them for 2 weeks and they are not answering the phone. She Possibly has a fever, neck pain, & sore throat     Pause was 188.   Sent to ER

## 2022-10-11 NOTE — DISCHARGE INSTRUCTIONS
Take antibiotics for strep throat  2. Call your dentist today to follow-up for your post procedure pain and  3.   Call your PCP/cardiology to set up Holter monitor

## 2022-10-11 NOTE — TELEPHONE ENCOUNTER
Patient called because she could not get in touch with her dentist.     Patient had a dental procedure in sept and now has a sore throat, fever, neck pain & pause 188. Candance advised Patient to go to ER now.  Patient agreed to go

## 2022-10-17 NOTE — ED PROVIDER NOTES
EMERGENCY DEPARTMENT HISTORY AND PHYSICAL EXAM      Date: 10/11/2022  Patient Name: Gaby Spaulding    History of Presenting Illness     Chief Complaint   Patient presents with    Sore Throat       History Provided By: Patient    HPI: Gaby Spaulding, 34 y.o. female with PMHx as noted below presents with multiple complaints. Patient's complaints include palpitations, dental pain and sore throat. Patient reports that she recently had dental extraction, is having some pain at the extraction site. Patient reports sore throat for the last 2 to 3 days, soreness worse with swallowing. Patient also complaining of palpitations. Patient states that several days ago she had episode of feeling as though her heart was racing. This was brief and self resolved, has been absent since and is only occurred this 1 time. Otherwise is not currently having the symptoms. Otherwise her sore throat is nonradiating, denies any shortness of breath or neck stiffness    Pt denies any other alleviating or exacerbating factors. Additionally, pt specifically denies any recent fever, chills, headache, nausea, vomiting, abdominal pain, CP, SOB, lightheadedness, dizziness, numbness, weakness, BLE swelling, urinary sxs, diarrhea, constipation, melena, hematochezia, cough, or congestion. PCP: Gideon Litten, NP    Current Outpatient Medications   Medication Sig Dispense Refill    ibuprofen (MOTRIN) 600 mg tablet Take 1 Tablet by mouth every six (6) hours as needed for Pain. 20 Tablet 0    clindamycin (CLEOCIN) 300 mg capsule Take 1 Capsule by mouth three (3) times daily for 10 days. 30 Capsule 0    chlorhexidine (Peridex) 0.12 % solution 15 mL by Swish and Spit route every twelve (12) hours for 14 days. 420 mL 0    Cetirizine (ZyrTEC) 10 mg cap Take  by mouth daily.       ProAir HFA 90 mcg/actuation inhaler INHALE 2 PUFFS BY MOUTH EVERY 4 HOURS AS NEEDED FOR WHEEZING FOR SHORTNESS OF BREATH 9 g 1    fluticasone propionate (FLONASE) 50 mcg/actuation nasal spray 2 Sprays by Both Nostrils route daily. 1 Each 5    montelukast (SINGULAIR) 10 mg tablet Take 1 Tablet by mouth daily. 90 Tablet 3       Past History     Past Medical History:  Past Medical History:   Diagnosis Date    Abnormal Pap smear     Unspecified epilepsy without mention of intractable epilepsy     4 seizures in past 3 mos       Past Surgical History:  Past Surgical History:   Procedure Laterality Date    HX GYN      csection. HX TUBAL LIGATION      [place] ABDOM[place] SURG[place] PROC UNLISTED      [place] child[place]         Family History:  Family History   Problem Relation Age of Onset    Stroke Mother     Cancer Maternal Aunt     Diabetes Maternal Grandmother     Heart Disease Maternal Grandmother     Hypertension Maternal Grandmother     Heart Disease Maternal Grandfather        Social History:  Social History     Tobacco Use    Smoking status: Every Day     Packs/day: 1.00     Years: 3.00     Pack years: 3.00     Types: Cigarettes    Smokeless tobacco: Never   Vaping Use    Vaping Use: Never used   Substance Use Topics    Alcohol use: No    Drug use: Never       Allergies: Allergies   Allergen Reactions    Zoloft [Sertraline] Other (comments)     tachycardia    Cephalexin Swelling         Review of Systems   Review of Systems  Constitutional: Negative for fever, chills, and fatigue. HENT: Positive sore throat. Negative for congestion, rhinorrhea, sneezing and neck stiffness   Eyes: Negative for discharge and redness. Respiratory: Negative for  shortness of breath, wheezing   Cardiovascular: Negative for chest pain. positive palpitations   Gastrointestinal: Negative for nausea, vomiting, abdominal pain, constipation, diarrhea and blood in stool. Genitourinary: Negative for dysuria, hematuria, flank pain, decreased urine volume, discharge,   Musculoskeletal: Negative for myalgias or joint pain .    Skin: Negative for rash or lesions . Neurological: Negative weakness, light-headedness, numbness and headaches. Physical Exam   Physical Exam    GENERAL: alert and oriented, no acute distress  EYES: PEERL, No injection, discharge or icterus. ENT: Mucous membranes pink and moist.  Bilateral tonsillar swelling, exudates. Tonsils are symmetric, uvula midline. Dental extraction site appears to be healing as expected, sutures in place, no swelling or drainage, soft tissues within normal limits. NECK: Supple, full range of motion without pain  LUNGS: Airway patent. Non-labored respirations. Breath sounds clear with good air entry bilaterally. HEART: Regular rate and rhythm. No peripheral edema  ABDOMEN: Non-distended and non-tender, without guarding or rebound. SKIN:  warm, dry  MSK/EXTREMITIES: Without swelling, tenderness or deformity, symmetric with normal ROM  NEUROLOGICAL: Alert, oriented      Diagnostic Study Results     Labs -   No results found for this or any previous visit (from the past 12 hour(s)). Radiologic Studies -   No orders to display     CT Results  (Last 48 hours)      None          CXR Results  (Last 48 hours)      None              Medical Decision Making     IKan MD am the first provider for this patient and am the attending of record for this patient encounter. I reviewed the vital signs, available nursing notes, past medical history, past surgical history, family history and social history. Vital Signs-Reviewed the patient's vital signs. EKG interpretation: (Preliminary)  Rhythm: normal sinus rhythm; and regular . Rate (approx.): 69; Axis: normal; P wave: normal; QRS interval: normal ; ST/T wave: normal;  was interpreted by Ailyn Downing MD,ED Provider. Records Reviewed: Nursing Notes and Old Medical Records    Provider Notes (Medical Decision Making): On presentation, the patient is well appearing, in no acute distress with normal vital signs.   Based on my history and exam the differential diagnosis for this patient includes dental infection, strep pharyngitis, arrhythmia. No signs of any significant dental infection on exam, extraction sites appear to be healing as expected without any complication. Patient did have some tonsillar exudates, strep was positive. No signs of peritonsillar retropharyngeal abscess, neck is supple, no pain with range of motion to suggest retropharyngeal abscess. With regards to palpitations that occurred several days ago, EKG here is reassuring, no indication for further work-up as this was self limited, will need further work-up as an outpatient, may benefit from Holter monitor. ED Course:   Initial assessment performed. The patients presenting problems have been discussed, and they are in agreement with the care plan formulated and outlined with them. I have encouraged them to ask questions as they arise throughout their visit. PROGRESS  Melinda Garcia's  results have been reviewed with her. She has been counseled regarding her diagnosis. She verbally conveys understanding and agreement of the signs, symptoms, diagnosis, treatment and prognosis and additionally agrees to follow up as recommended with Dr. Claudia Smith, NP in 24 - 48 hours. She also agrees with the care-plan and conveys that all of her questions have been answered. I have also put together some discharge instructions for her that include: 1) educational information regarding their diagnosis, 2) how to care for their diagnosis at home, as well a 3) list of reasons why they would want to return to the ED prior to their follow-up appointment, should their condition change. Disposition:  home    PLAN:  1.    Discharge Medication List as of 10/11/2022 11:52 AM        START taking these medications    Details   ibuprofen (MOTRIN) 600 mg tablet Take 1 Tablet by mouth every six (6) hours as needed for Pain., Normal, Disp-20 Tablet, R-0      clindamycin (CLEOCIN) 300 mg capsule Take 1 Capsule by mouth three (3) times daily for 10 days. , Normal, Disp-30 Capsule, R-0           CONTINUE these medications which have NOT CHANGED    Details   chlorhexidine (Peridex) 0.12 % solution 15 mL by Swish and Spit route every twelve (12) hours for 14 days. , Normal, Disp-420 mL, R-0      Cetirizine (ZyrTEC) 10 mg cap Take  by mouth daily. , Historical Med      ProAir HFA 90 mcg/actuation inhaler INHALE 2 PUFFS BY MOUTH EVERY 4 HOURS AS NEEDED FOR WHEEZING FOR SHORTNESS OF BREATH, Normal, Disp-9 g, R-1, LAURA      fluticasone propionate (FLONASE) 50 mcg/actuation nasal spray 2 Sprays by Both Nostrils route daily. , Normal, Disp-1 Each, R-5      montelukast (SINGULAIR) 10 mg tablet Take 1 Tablet by mouth daily. , Normal, Disp-90 Tablet, R-3           2. Follow-up Information       Follow up With Specialties Details Why Contact Liliana Masterson NP Nurse Practitioner Schedule an appointment as soon as possible for a visit in 2 days  [address] [place] [address]    [address] [facility]  [phone]          Return to ED if worse     Diagnosis     Clinical Impression:   1. Pain, dental    2. Palpitations    3. Acute streptococcal pharyngitis        Please note that this dictation was completed with Dragon, computer voice recognition software. Quite often unanticipated grammatical, syntax, homophones, and other interpretive errors are inadvertently transcribed by the computer software. Please disregard these errors. Additionally, please excuse any errors that have escaped final proofreading.

## 2022-10-19 ENCOUNTER — OFFICE VISIT (OUTPATIENT)
Dept: FAMILY MEDICINE CLINIC | Age: 32
End: 2022-10-19
Payer: MEDICAID

## 2022-10-19 VITALS
WEIGHT: 115.2 LBS | TEMPERATURE: 98.1 F | OXYGEN SATURATION: 98 % | SYSTOLIC BLOOD PRESSURE: 86 MMHG | HEART RATE: 76 BPM | DIASTOLIC BLOOD PRESSURE: 68 MMHG | RESPIRATION RATE: 16 BRPM | BODY MASS INDEX: 23.22 KG/M2 | HEIGHT: 59 IN

## 2022-10-19 DIAGNOSIS — Z72.0 TOBACCO USE: ICD-10-CM

## 2022-10-19 DIAGNOSIS — I95.9 HYPOTENSION, UNSPECIFIED HYPOTENSION TYPE: ICD-10-CM

## 2022-10-19 DIAGNOSIS — Z82.49 FAMILY HISTORY OF MI (MYOCARDIAL INFARCTION): ICD-10-CM

## 2022-10-19 DIAGNOSIS — F43.0 ANXIETY AS ACUTE REACTION TO EXCEPTIONAL STRESS: ICD-10-CM

## 2022-10-19 DIAGNOSIS — Z76.89 ENCOUNTER TO ESTABLISH CARE: Primary | ICD-10-CM

## 2022-10-19 DIAGNOSIS — J45.20 MILD INTERMITTENT ASTHMA WITHOUT COMPLICATION: ICD-10-CM

## 2022-10-19 DIAGNOSIS — Z13.220 LIPID SCREENING: ICD-10-CM

## 2022-10-19 DIAGNOSIS — Q39.8: ICD-10-CM

## 2022-10-19 DIAGNOSIS — K02.9 TOOTH DECAY: ICD-10-CM

## 2022-10-19 DIAGNOSIS — F41.1 ANXIETY AS ACUTE REACTION TO EXCEPTIONAL STRESS: ICD-10-CM

## 2022-10-19 DIAGNOSIS — R13.10 DYSPHAGIA, UNSPECIFIED TYPE: ICD-10-CM

## 2022-10-19 DIAGNOSIS — R00.0 TACHYCARDIA: ICD-10-CM

## 2022-10-19 DIAGNOSIS — K21.9 GASTROESOPHAGEAL REFLUX DISEASE, UNSPECIFIED WHETHER ESOPHAGITIS PRESENT: ICD-10-CM

## 2022-10-19 DIAGNOSIS — Z11.59 ENCOUNTER FOR HEPATITIS C SCREENING TEST FOR LOW RISK PATIENT: ICD-10-CM

## 2022-10-19 LAB — HGB BLD-MCNC: 12.9 G/DL

## 2022-10-19 PROCEDURE — 99214 OFFICE O/P EST MOD 30 MIN: CPT | Performed by: NURSE PRACTITIONER

## 2022-10-19 PROCEDURE — 36415 COLL VENOUS BLD VENIPUNCTURE: CPT | Performed by: NURSE PRACTITIONER

## 2022-10-19 PROCEDURE — 85018 HEMOGLOBIN: CPT | Performed by: NURSE PRACTITIONER

## 2022-10-19 RX ORDER — VENLAFAXINE HYDROCHLORIDE 75 MG/1
CAPSULE, EXTENDED RELEASE ORAL
Qty: 30 CAPSULE | Refills: 0 | Status: SHIPPED | OUTPATIENT
Start: 2022-10-19

## 2022-10-19 RX ORDER — VENLAFAXINE HYDROCHLORIDE 37.5 MG/1
CAPSULE, EXTENDED RELEASE ORAL
Qty: 7 CAPSULE | Refills: 0 | Status: SHIPPED | OUTPATIENT
Start: 2022-10-19

## 2022-10-19 NOTE — PROGRESS NOTES
Identified pt with two pt identifiers(name and ). Reviewed record in preparation for visit and have obtained necessary documentation. Chief Complaint   Patient presents with    Dental Pain     ER follow-up       1. \"Have you been to the ER, urgent care clinic since your last visit? Hospitalized since your last visit? \" YESm Dental pain    2. \"Have you seen or consulted any other health care providers outside of the [address] [place] [address] since your last visit? \" No     3. For patients aged 39-70: Has the patient had a colonoscopy / FIT/ Cologuard? NA - based on age      If the patient is female:    4. For patients aged 41-77: Has the patient had a mammogram within the past 2 years? NA - based on age or sex      11. For patients aged 21-65: Has the patient had a pap smear?  Yes - no [place] present

## 2022-10-19 NOTE — PROGRESS NOTES
Subjective:     CC: [facility]    Kiah Baker is a 34 y.o. female who presents today to [facility] [address]. She is transferring from the [facility]. Poor dentition  She had her upper teeth pulled last month and is going back on the 26th to get the lower teeth pulled. She is currently taking Clindamycin for strep throat. States her last dose in a few days. Her throat is feeling better. Asthma r/t allergies  She is on Singulair , Cetirizine, and Flonase daily with Albuterol prn, uses it about once a week. Denies any coughing, wheezing, or SOB today. She does smoke daily. Anxiety r/t to exceptional stress  She has been taking care of her mother and  who have multiple health issues. She also has to care for her 2 young sons. Her older son has ADD. They also have lots of pets. She has been very overwhelmed, irritable, and yells a lot at home. 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 Paperwork is under review. Waiting on background check . She has tried Zoloft in the past  but had an allergic reaction to it, states her heart started racing. Of note she mentions she has a hx of ADD but is not currently taking anything for it. Tachycardia  Her heart continues to race off and on. The other day she had some chest pain. Today her BP is very low at 80/60, rechecked manually at 86/68. Denies any dizziness or lightheadedness or swelling. She has an upcoming appt in December with cardiology specialist KEMI Adam. She states her grandmother had a heart attack in her 29's    Dysphagia and GERD  States she was a preemie and weighed a little over 1 pound at birth. 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  She currently c/o dysphagia and GERD and would like to see a GI specialist.     She has chronic intermittent left knee pain and clicking ever since she injured it years ago. She landed on a big bouncing ball. Hx of seizures when she was pregnant, has not had a seizure since. Health maintenance  PAP- states she had this done last year, not due for another 2 years   Mammo- no FH of breast cancer in mom or siblings. Colonoscopy- no FH of colon cancer in parents or siblings. Flu shot- plans on getting this later after teeth pulled  Covid vaccine- plans on getting this later after teeth pulled. Hep C screening due    Patient Active Problem List   Diagnosis Code    Seizure disorder in pregnancy ([facility] [address] O99.350, G40.909    Wellness examination Z00.00    Mild intermittent asthma without complication X33.44    Infected nailbed of toe L03.039    Pregnancy complicated by fetal skeletal dysplasia O35. 10XX0       Past Medical History:   Diagnosis Date    Abnormal Pap smear 2010    Unspecified epilepsy without mention of intractable epilepsy     4 seizures in past 3 mos         Current Outpatient Medications:     ibuprofen (MOTRIN) 600 mg tablet, Take 1 Tablet by mouth every six (6) hours as needed for Pain., Disp: 20 Tablet, Rfl: 0    clindamycin (CLEOCIN) 300 mg capsule, Take 1 Capsule by mouth three (3) times daily for 10 days. , Disp: 30 Capsule, Rfl: 0    chlorhexidine (Peridex) 0.12 % solution, 15 mL by Swish and Spit route every twelve (12) hours for 14 days. , Disp: 420 mL, Rfl: 0    Cetirizine (ZyrTEC) 10 mg cap, Take  by mouth daily. , Disp: , Rfl:     ProAir HFA 90 mcg/actuation inhaler, INHALE 2 PUFFS BY MOUTH EVERY 4 HOURS AS NEEDED FOR WHEEZING FOR SHORTNESS OF BREATH, Disp: 9 g, Rfl: 1    fluticasone propionate (FLONASE) 50 mcg/actuation nasal spray, 2 Sprays by Both Nostrils route daily. , Disp: 1 Each, Rfl: 5    montelukast (SINGULAIR) 10 mg tablet, Take 1 Tablet by mouth daily. , Disp: 90 Tablet, Rfl: 3    Allergies   Allergen Reactions    Zoloft [Sertraline] Other (comments)     tachycardia    Cephalexin Swelling       Past Surgical History:   Procedure Laterality Date    HX GYN      csection. HX TUBAL LIGATION      NE ABDOMEN SURGERY PROC UNLISTED      niessen surgery and PEG tube as child.     NE  DELIVERY ONLY  -       Social History     Tobacco Use   Smoking Status Every Day    Packs/day: 1.00    Years: 3.00    Pack years: 3.00    Types: Cigarettes   Smokeless Tobacco Never       Social History     Socioeconomic History    Marital status:    Tobacco Use    Smoking status: Every Day     Packs/day: 1.00     Years: 3.00     Pack years: 3.00     Types: Cigarettes    Smokeless tobacco: Never   Vaping Use    Vaping Use: Never used   Substance and Sexual Activity    Alcohol use: No    Drug use: Never    Sexual activity: Yes     Partners: Male     Birth control/protection: Pill, Condom, Surgical   Other Topics Concern     Service No    Blood Transfusions Yes    Caffeine Concern Yes    Occupational Exposure No    Hobby Hazards No    Sleep Concern No    Stress Concern No    Weight Concern Yes    Special Diet No    Back Care Yes    Exercise Yes    Seat Belt Yes    Self-Exams Yes     Social Determinants of Health     Financial Resource Strain: Low Risk     Difficulty of Paying Living Expenses: Not hard at all   Food Insecurity: No Food Insecurity    Worried About Running Out of Food in the Last Year: Never true    Ran Out of Food in the Last Year: Never true   Transportation Needs: No Transportation Needs    Lack of Transportation (Medical): No    Lack of Transportation (Non-Medical): No       Family History   Problem Relation Age of Onset    Stroke Mother     Cancer Maternal Aunt     Diabetes Maternal Grandmother     Heart Disease Maternal Grandmother     Hypertension Maternal Grandmother     Heart Disease Maternal Grandfather        ROS:  Gen: denies fever, chills, or fatigue  HEENT: +poor dentition, missing all upper teeth, denies H/A, nasal congestion, ear pain, or sore throat  Resp: denies dyspnea, cough, or wheezing  CV: +occasional chest pain and heart racing, denies syncope  Extremeties: denies edema  GI[de-identified] +dysphagia and GERD, denies abdominal pain, nausea, vomiting, diarrhea, or constipation  GYN: +heavy irreg menses  Musculoskeletal: +chronic intermittent led knee pain  Neuro: denies numbness/tingling or dizziness  Skin: denies rashes or new lesions   Psych: + anxiety, denies depression, alek, or other changes in mood      Objective:     Visit Vitals  BP (!) 86/68   Pulse 76   Temp 98.1 °F (36.7 °C) (Oral)   Resp 16   Ht 4' 11\" (1.499 m)   Wt 115 lb 3.2 oz (52.3 kg)   SpO2 98%   BMI 23.27 kg/m²       General: Alert and oriented. No acute distress. Well nourished. HEENT :  Eyes: Sclera white, conjunctiva clear. PERRLA. Extra ocular movements intact. Nose: Patent. Nasal mucosa pink, non-edematous, and without drainage. Mouth: Pharynx non-erythematous, without exudate. +missing all upper teeth and bottom molars, the remaining bottom teeth are decayed   Neck: Supple with FROM. Lungs: Breathing even and unlabored. All lobes clear to auscultation bilaterally   Heart :RRR, S1 and S2 normal intensity, no extra heart sounds  Extremities: Non-edematous, no pallor or cyanosis  Abdomen: Soft and non-distended. Bowel sounds active. No tenderness to palpation, no masses. Musculoskeletal: No joint pain, heat, erythema, or swelling. Neuro: Cranial nerves grossly normal.  Psych: Mood and thought content appropriate for situation. Dressed appropriately and with good hygiene. Skin: Warm, dry, and intact. No lesions or discoloration.     Assessment/ Plan:     Encounter to establish care    Poor dentition  F/U with the dentist as scheduled    Strep throat  Finish antibiotic    Asthma r/t allergies  Well controlled  Cont current meds  Avoids triggers  Needs to stop smoking  F/U 6 months or sooner prn worsening symptoms    Anxiety r/t to exceptional stress  Caregiver burden  She would like to take something to help calm her nerves  Script sent for Effexor 37.5mg daily x 1 week then increase to 75mg daily  S/e reviewed  F/U 1 month. Tachycardia  She has an upcoming appt in December with cardiology specialist NP Andres Malhotra. Check CMP, CBC, mag, and TSH    Hypotension  She is asymptomatic  POC HGB checked- stable at 12.9  Stress test and ECHO ordered- states her grandmother had a heart attack in her 30's  She has an upcoming appt in December with cardiology specialist NP Andres Malhotra. Go to er for worsening CP or new SOB or dizziness/syncope  F/U 1 month    Dysphagia and GERD with hx of congenital malposition of esophagus  Referred to GI     Health maintenance  PAP- states she had this done last year, not due for another 2 years   Mammo- no FH of breast cancer in mom or siblings. Colonoscopy- no FH of colon cancer in parents or siblings. Flu shot- plans on getting this later after teeth pulled  Covid vaccine- plans on getting this later after teeth pulled. Hep C screening due        Verbal and written instructions (see AVS) provided. Patient expresses understanding of diagnosis and treatment plan. Health Maintenance Due   Topic Date Due    Hepatitis C Screening  Never done    Pneumococcal 0-64 years (2 - PCV) 01/31/2021    Flu Vaccine (1) 08/01/2022               Roger Hernandez, KEMI

## 2022-10-20 ENCOUNTER — DOCUMENTATION ONLY (OUTPATIENT)
Dept: FAMILY MEDICINE CLINIC | Age: 32
End: 2022-10-20

## 2022-10-21 PROBLEM — Z63.6 CAREGIVER BURDEN: Status: ACTIVE | Noted: 2022-10-21

## 2022-10-21 PROBLEM — F43.0 ANXIETY AS ACUTE REACTION TO EXCEPTIONAL STRESS: Status: ACTIVE | Noted: 2022-10-21

## 2022-10-21 PROBLEM — Q39.8: Status: ACTIVE | Noted: 2022-10-21

## 2022-10-21 PROBLEM — F41.1 ANXIETY AS ACUTE REACTION TO EXCEPTIONAL STRESS: Status: ACTIVE | Noted: 2022-10-21

## 2022-10-21 PROBLEM — K08.9 POOR DENTITION: Status: ACTIVE | Noted: 2022-10-21

## 2022-10-21 PROBLEM — L03.039 INFECTED NAILBED OF TOE: Status: RESOLVED | Noted: 2020-02-02 | Resolved: 2022-10-21

## 2022-10-21 PROBLEM — Z00.00 WELLNESS EXAMINATION: Status: RESOLVED | Noted: 2019-04-19 | Resolved: 2022-10-21

## 2022-10-21 PROBLEM — E78.00 PURE HYPERCHOLESTEROLEMIA: Status: ACTIVE | Noted: 2022-10-21

## 2022-10-21 LAB
ALBUMIN SERPL-MCNC: 4.7 G/DL (ref 3.8–4.8)
ALBUMIN/GLOB SERPL: 2.1 {RATIO} (ref 1.2–2.2)
ALP SERPL-CCNC: 65 IU/L (ref 44–121)
ALT SERPL-CCNC: 21 IU/L (ref 0–32)
AST SERPL-CCNC: 23 IU/L (ref 0–40)
BILIRUB SERPL-MCNC: 0.2 MG/DL (ref 0–1.2)
BUN SERPL-MCNC: 9 MG/DL (ref 6–20)
BUN/CREAT SERPL: 10 (ref 9–23)
CALCIUM SERPL-MCNC: 9.9 MG/DL (ref 8.7–10.2)
CHLORIDE SERPL-SCNC: 104 MMOL/L (ref 96–106)
CHOLEST SERPL-MCNC: 199 MG/DL (ref 100–199)
CO2 SERPL-SCNC: 23 MMOL/L (ref 20–29)
CREAT SERPL-MCNC: 0.88 MG/DL (ref 0.57–1)
EGFR: 89 ML/MIN/1.73
ERYTHROCYTE [DISTWIDTH] IN BLOOD BY AUTOMATED COUNT: 13.8 % (ref 11.7–15.4)
GLOBULIN SER CALC-MCNC: 2.2 G/DL (ref 1.5–4.5)
GLUCOSE SERPL-MCNC: 88 MG/DL (ref 70–99)
HCT VFR BLD AUTO: 40.3 % (ref 34–46.6)
HCV AB S/CO SERPL IA: <0.1 S/CO RATIO (ref 0–0.9)
HDLC SERPL-MCNC: 58 MG/DL
HGB BLD-MCNC: 13.1 G/DL (ref 11.1–15.9)
IMP & REVIEW OF LAB RESULTS: NORMAL
LDLC SERPL CALC-MCNC: 123 MG/DL (ref 0–99)
MAGNESIUM SERPL-MCNC: 2.2 MG/DL (ref 1.6–2.3)
MCH RBC QN AUTO: 29 PG (ref 26.6–33)
MCHC RBC AUTO-ENTMCNC: 32.5 G/DL (ref 31.5–35.7)
MCV RBC AUTO: 89 FL (ref 79–97)
PLATELET # BLD AUTO: 447 X10E3/UL (ref 150–450)
POTASSIUM SERPL-SCNC: 4.6 MMOL/L (ref 3.5–5.2)
PROT SERPL-MCNC: 6.9 G/DL (ref 6–8.5)
RBC # BLD AUTO: 4.51 X10E6/UL (ref 3.77–5.28)
SODIUM SERPL-SCNC: 141 MMOL/L (ref 134–144)
TRIGL SERPL-MCNC: 103 MG/DL (ref 0–149)
TSH SERPL DL<=0.005 MIU/L-ACNC: 2.15 UIU/ML (ref 0.45–4.5)
VLDLC SERPL CALC-MCNC: 18 MG/DL (ref 5–40)
WBC # BLD AUTO: 7.1 X10E3/UL (ref 3.4–10.8)

## 2022-10-21 NOTE — PROGRESS NOTES
Labs look great including blood count, blood sugar, thyroid function, kidney and liver function.  Cholesterol is just a little high, work on low fat diet

## 2022-10-24 ENCOUNTER — DOCUMENTATION ONLY (OUTPATIENT)
Dept: FAMILY MEDICINE CLINIC | Age: 32
End: 2022-10-24

## 2022-10-24 NOTE — PROGRESS NOTES
Pt doesn't want to have the stress test and echo done at [facility] because [facility] does not do the exercise stress so she would have to go to 2 different places, wants it faxed to [facility].

## 2022-10-25 ENCOUNTER — TELEPHONE (OUTPATIENT)
Dept: FAMILY MEDICINE CLINIC | Age: 32
End: 2022-10-25

## 2022-10-25 NOTE — TELEPHONE ENCOUNTER
Pt stated Efrain Anderson ordered a stress test & echo but pt is requesting to go to [facility]

## 2022-11-17 ENCOUNTER — TELEPHONE (OUTPATIENT)
Dept: FAMILY MEDICINE CLINIC | Age: 32
End: 2022-11-17

## 2022-11-17 ENCOUNTER — OFFICE VISIT (OUTPATIENT)
Dept: FAMILY MEDICINE CLINIC | Age: 32
End: 2022-11-17
Payer: MEDICAID

## 2022-11-17 VITALS
SYSTOLIC BLOOD PRESSURE: 96 MMHG | DIASTOLIC BLOOD PRESSURE: 52 MMHG | HEART RATE: 60 BPM | TEMPERATURE: 97.9 F | OXYGEN SATURATION: 100 % | WEIGHT: 114 LBS | BODY MASS INDEX: 22.98 KG/M2 | HEIGHT: 59 IN

## 2022-11-17 DIAGNOSIS — R13.10 DYSPHAGIA, UNSPECIFIED TYPE: ICD-10-CM

## 2022-11-17 DIAGNOSIS — K02.9 TOOTH DECAY: ICD-10-CM

## 2022-11-17 DIAGNOSIS — K21.9 GASTROESOPHAGEAL REFLUX DISEASE, UNSPECIFIED WHETHER ESOPHAGITIS PRESENT: ICD-10-CM

## 2022-11-17 DIAGNOSIS — I95.9 HYPOTENSION, UNSPECIFIED HYPOTENSION TYPE: Primary | ICD-10-CM

## 2022-11-17 DIAGNOSIS — F43.0 ANXIETY AS ACUTE REACTION TO EXCEPTIONAL STRESS: ICD-10-CM

## 2022-11-17 DIAGNOSIS — R06.02 SOB (SHORTNESS OF BREATH): ICD-10-CM

## 2022-11-17 DIAGNOSIS — F41.1 ANXIETY AS ACUTE REACTION TO EXCEPTIONAL STRESS: ICD-10-CM

## 2022-11-17 DIAGNOSIS — R00.0 TACHYCARDIA: ICD-10-CM

## 2022-11-17 PROCEDURE — 99214 OFFICE O/P EST MOD 30 MIN: CPT | Performed by: NURSE PRACTITIONER

## 2022-11-17 RX ORDER — VENLAFAXINE HYDROCHLORIDE 75 MG/1
CAPSULE, EXTENDED RELEASE ORAL
Qty: 90 CAPSULE | Refills: 0 | Status: SHIPPED | OUTPATIENT
Start: 2022-11-17

## 2022-11-17 NOTE — PROGRESS NOTES
Subjective:     CC: hypotension    Brooke Perkins is a 34 y.o. female who presents today for a 1 month follow up for hypotension and other issues. Hypotension   At the last OV her BP was very low at 80/60, she reported heart racing off and on and a single episode of chest pain. She denied any dizziness or lightheadedness or swelling. She stated her grandmother had a heart attack in her 29's  TSH was normal. An ECHO and stress test were ordered. These are scheduled for December 28. BP is better today. Since starting Effexor she states her heart has not been racing as much. She has an upcoming appt in December with cardiology specialist NP Ayleen Grubbs. Anxiety r/t to exceptional stress  She has been taking care of her mother and  who have multiple health issues. She also has to care for her 2 young sons. Her older son has ADD. They also have lots of pets. At the last OV she reported feeling very overwhelmed, irritable, and was yelling a lot at home. 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 Paperwork is still under review. Waiting on background check . She was started on Effexor 75mg rojas and today she states her anxiety has definitely improved. Of note she has tried Zoloft in the past  but had an allergic reaction to it, states her heart started racing. She also mentions she has a hx of ADD but is not currently taking anything for it. 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 She c/o dysphagia and GERD and at the last OV was referred to GI. They are supposed to be calling her back to scheduled an appt.      Poor dentition  She had her upper teeth pulled last month and was supposed to go back on the 26th to get the lower teeth pulled but she post poned this until after the holidays. Health maintenance  PAP- states she had this done last year, not due for another 2 years   Mammo- no FH of breast cancer in mom or siblings. Colonoscopy- no FH of colon cancer in parents or siblings. 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 , Disp: 30 Capsule, Rfl: 0    ibuprofen (MOTRIN) 600 mg tablet, Take 1 Tablet by mouth every six (6) hours as needed for Pain., Disp: 20 Tablet, Rfl: 0    Cetirizine (ZyrTEC) 10 mg cap, Take  by mouth daily. , Disp: , Rfl:     ProAir HFA 90 mcg/actuation inhaler, INHALE 2 PUFFS BY MOUTH EVERY 4 HOURS AS NEEDED FOR WHEEZING FOR SHORTNESS OF BREATH, Disp: 9 g, Rfl: 1    fluticasone propionate (FLONASE) 50 mcg/actuation nasal spray, 2 Sprays by Both Nostrils route daily. , Disp: 1 Each, Rfl: 5    montelukast (SINGULAIR) 10 mg tablet, Take 1 Tablet by mouth daily. , Disp: 90 Tablet, Rfl: 3    Allergies   Allergen Reactions    Zoloft [Sertraline] Other (comments)     tachycardia    Cephalexin Swelling       Past Surgical History:   Procedure Laterality Date    HX GYN csection. HX TUBAL LIGATION      AL ABDOMEN SURGERY PROC UNLISTED      niessen surgery and PEG tube as child.     AL  DELIVERY ONLY  -       Social History     Tobacco Use   Smoking Status Every Day    Packs/day: 1.00    Years: 3.00    Pack years: 3.00    Types: Cigarettes   Smokeless Tobacco Never       Social History     Socioeconomic History    Marital status:    Tobacco Use    Smoking status: Every Day     Packs/day: 1.00     Years: 3.00     Pack years: 3.00     Types: Cigarettes    Smokeless tobacco: Never   Vaping Use    Vaping Use: Never used   Substance and Sexual Activity    Alcohol use: No    Drug use: Never    Sexual activity: Yes     Partners: Male     Birth control/protection: Pill, Condom, Surgical   Other Topics Concern     Service No    Blood Transfusions Yes    Caffeine Concern Yes    Occupational Exposure No    Hobby Hazards No    Sleep Concern No    Stress Concern No    Weight Concern Yes    Special Diet No    Back Care Yes    Exercise Yes    Seat Belt Yes    Self-Exams Yes     Social Determinants of Health     Financial Resource Strain: Low Risk     Difficulty of Paying Living Expenses: Not hard at all   Food Insecurity: No Food Insecurity    Worried About Running Out of Food in the Last Year: Never true    Ran Out of Food in the Last Year: Never true   Transportation Needs: No Transportation Needs    Lack of Transportation (Medical): No    Lack of Transportation (Non-Medical): No       Family History   Problem Relation Age of Onset    Stroke Mother     Cancer Maternal Aunt     Diabetes Maternal Grandmother     Heart Disease Maternal Grandmother     Hypertension Maternal Grandmother     Heart Disease Maternal Grandfather        ROS:  Gen: denies fever, chills, or fatigue  HEENT: +poor dentition, missing all upper teeth, denies H/A, nasal congestion, ear pain, or sore throat  Resp: denies dyspnea, cough, or wheezing  CV: +occasional chest pain and heart racing, denies syncope  Extremeties: denies edema  GI[de-identified] +dysphagia and GERD, denies abdominal pain, nausea, vomiting, diarrhea, or constipation  GYN: +heavy irreg menses  Musculoskeletal: +chronic intermittent left knee pain  Neuro: denies numbness/tingling or dizziness  Skin: denies rashes or new lesions   Psych: + anxiety- improved, denies depression, alek, or other changes in mood      Objective:     Visit Vitals  BP (!) 96/52 (BP 1 Location: Left upper arm)   Pulse 60   Temp 97.9 °F (36.6 °C) (Oral)   Ht 4' 11\" (1.499 m)   Wt 114 lb (51.7 kg)   SpO2 100%   BMI 23.03 kg/m²       General: Alert and oriented. No acute distress. Well nourished. HEENT :  Eyes: Sclera white, conjunctiva clear. PERRLA. Extra ocular movements intact. Mouth: Pharynx non-erythematous, without exudate. +missing all upper teeth and bottom molars, the remaining bottom teeth are decayed   Neck: Supple with FROM. Lungs: Breathing even and unlabored. All lobes clear to auscultation bilaterally   Heart :RRR, S1 and S2 normal intensity, no extra heart sounds  Extremities: Non-edematous, no pallor or cyanosis  Abdomen: Soft and non-distended. Bowel sounds active. No tenderness to palpation, no masses. Musculoskeletal: No joint pain, heat, erythema, or swelling. Neuro: Cranial nerves grossly normal.  Psych: Mood and thought content appropriate for situation. Dressed appropriately and with good hygiene. Skin: Warm, dry, and intact. No lesions or discoloration. Assessment/ Plan:     Anxiety r/t to exceptional stress  Caregiver burden  Anxiety improved  Cont Effexor 75mg daily- refilled  F/U 3 months. Poor dentition  F/U with the dentist as scheduled    Tachycardia  Somewhat improved with Effexor  She has an upcoming appt in December with cardiology specialist KEMI Olivia.     Hypotension  BP better today, in the 90's  She is asymptomatic  Stress test and ECHO scheduled for next month  She has an upcoming appt in December with cardiology specialist NP Alayna Olivia. Go to er for worsening CP or new SOB or dizziness/syncope  F/U 3 months    Dysphagia and GERD with hx of congenital malposition of esophagus  Referred to GI- waiting to get appt         Verbal and written instructions (see AVS) provided. Patient expresses understanding of diagnosis and treatment plan. Health Maintenance Due   Topic Date Due    Pneumococcal 0-64 years (2 - PCV) 01/31/2021    COVID-19 Vaccine (4 - Booster for Pfizer series) 01/17/2022    Flu Vaccine (1) 08/01/2022               Jacques Pemberton, KEMI

## 2022-12-12 NOTE — PROGRESS NOTES
[place] [address], [address] [place] [address]  [phone]  [address] [facility] [facility] [address]      Subjective:      Juan Viveros is a 34 y.o. female is here for new patient consultation. Pmhx as posted below. Current 0.5 PPD smoker; + family hx CAD. Referred by PCP for intermittent palpitation, cp.    Shahnaz Arianne HPI 2022    At the last OV her BP was very low at 80/60, she reported heart racing off and on and a single episode of chest pain. She denied any dizziness or lightheadedness or swelling. She stated her grandmother had a heart attack in her 29's  TSH was normal. An ECHO and stress test were ordered. These are scheduled for . BP is better today. Since starting Effexor she states her heart has not been racing as much. Today,   No further palpitation since she's been on effexor. Awaiting stress test and echo. Still smoking,  0.5 PPD. Stable sob, relieved with inhaler    the patient denies  orthopnea, PND, LE edema,  syncope, or presyncope. Patient Active Problem List    Diagnosis Date Noted    Pure hypercholesterolemia 10/21/2022    Anxiety as acute reaction to exceptional stress 10/21/2022    Caregiver burden 10/21/2022    Congenital malposition of esophagus 10/21/2022    Poor dentition 10/21/2022    Mild intermittent asthma without complication       Roni ASHLEY, NP  Past Medical History:   Diagnosis Date    Abnormal Pap smear     Anxiety as acute reaction to exceptional stress 10/21/2022    Unspecified epilepsy without mention of intractable epilepsy     4 seizures in past 3 mos      Past Surgical History:   Procedure Laterality Date    HX GYN      csection. HX TUBAL LIGATION      [place] ABDOMEN SURGERY PROC UNLISTED      niessen surgery and PEG tube as child.     [place]  DELIVERY ONLY  -     Allergies   Allergen Reactions    Zoloft [Sertraline] Other (comments)     tachycardia    Cephalexin Swelling      Family History Problem Relation Age of Onset    Stroke Mother     Cancer Maternal Aunt     Diabetes Maternal Grandmother     Heart Disease Maternal Grandmother     Hypertension Maternal Grandmother     Heart Disease Maternal Grandfather       Social History     Socioeconomic History    Marital status:      Spouse name: Not on file    Number of children: Not on file    Years of education: Not on file    Highest education level: Not on file   Occupational History    Not on file   Tobacco Use    Smoking status: Every Day     Packs/day: 1.00     Years: 3.00     Pack years: 3.00     Types: Cigarettes    Smokeless tobacco: Never   Vaping Use    Vaping Use: Never used   Substance and Sexual Activity    Alcohol use: No    Drug use: Never    Sexual activity: Yes     Partners: Male     Birth control/protection: Pill, Condom, Surgical   Other Topics Concern     Service No    Blood Transfusions Yes    Caffeine Concern Yes    Occupational Exposure No    Hobby Hazards No    Sleep Concern No    Stress Concern No    Weight Concern Yes    Special Diet No    Back Care Yes    Exercise Yes    Bike Helmet Not Asked    Seat Belt Yes    Self-Exams Yes   Social History Narrative    Not on file     Social Determinants of Health     Financial Resource Strain: Low Risk     Difficulty of Paying Living Expenses: Not hard at all   Food Insecurity: No Food Insecurity    Worried About Running Out of Food in the Last Year: Never true    Ran Out of Food in the Last Year: Never true   Transportation Needs: No Transportation Needs    Lack of Transportation (Medical): No    Lack of Transportation (Non-Medical): No   Physical Activity: Not on file   Stress: Not on file   Social Connections: Not on file   Intimate Partner Violence: Not on file   Housing Stability: Not on file      Current Outpatient Medications   Medication Sig    venlafaxine-SR (EFFEXOR-XR) 75 mg capsule Take 1 pill by mouth daily.     ibuprofen (MOTRIN) 600 mg tablet Take 1 Tablet by mouth every six (6) hours as needed for Pain. Cetirizine (ZyrTEC) 10 mg cap Take  by mouth daily. ProAir HFA 90 mcg/actuation inhaler INHALE 2 PUFFS BY MOUTH EVERY 4 HOURS AS NEEDED FOR WHEEZING FOR SHORTNESS OF BREATH    fluticasone propionate (FLONASE) 50 mcg/actuation nasal spray 2 Sprays by Both Nostrils route daily. montelukast (SINGULAIR) 10 mg tablet Take 1 Tablet by mouth daily. No current facility-administered medications for this visit. Review of Symptoms:  11 systems reviewed, negative other than as stated in the HPI    Physical ExamPhysical Exam:    Vitals:    12/15/22 1325   BP: 106/80   Pulse: 77   Resp: 20   Temp: 97.5 °F (36.4 °C)   TempSrc: Temporal   SpO2: 97%   Weight: 114 lb (51.7 kg)   Height: 4' 11\" (1.499 m)     Body mass index is 23.03 kg/m². General PE  Gen:  NAD  Mental Status - Alert. General Appearance - Not in acute distress. HEENT:  PERRL, no carotid bruits or JVD  Chest and Lung Exam   Inspection: Accessory muscles - No use of accessory muscles in breathing. Auscultation:   Breath sounds: - Normal.   Cardiovascular   Inspection: Jugular vein - Bilateral - Inspection Normal.   Palpation/Percussion:   Apical Impulse: - Normal.   Auscultation: Rhythm - Regular. Heart Sounds - S1 WNL and S2 WNL. No S3 or S4. Murmurs & Other Heart Sounds: Auscultation of the heart reveals - No Murmurs. Peripheral Vascular   Upper Extremity: Inspection - Bilateral - No Cyanotic nailbeds or Digital clubbing. Lower Extremity:   Palpation: Edema - Bilateral - No edema. Abdomen:   Soft, non-tender, bowel sounds are active.   Neuro: A&O times 3, CN and motor grossly WNL    Labs:   Lab Results   Component Value Date/Time    Cholesterol, total 199 10/19/2022 10:19 AM    Cholesterol, total 191 04/19/2019 09:09 AM    Cholesterol, total 184 03/15/2018 08:28 AM    Cholesterol, total 172 05/30/2017 12:20 PM    HDL Cholesterol 58 10/19/2022 10:19 AM    HDL Cholesterol 65 04/19/2019 09:09 AM    HDL Cholesterol 68 03/15/2018 08:28 AM    HDL Cholesterol 65 05/30/2017 12:20 PM    LDL, calculated 123 (H) 10/19/2022 10:19 AM    LDL, calculated 117 (H) 04/19/2019 09:09 AM    LDL, calculated 104.4 (H) 03/15/2018 08:28 AM    LDL, calculated 94.8 05/30/2017 12:20 PM    Triglyceride 103 10/19/2022 10:19 AM    Triglyceride 46 04/19/2019 09:09 AM    Triglyceride 58 03/15/2018 08:28 AM    Triglyceride 61 05/30/2017 12:20 PM    CHOL/HDL Ratio 2.7 03/15/2018 08:28 AM    CHOL/HDL Ratio 2.6 05/30/2017 12:20 PM     No results found for: CPK, CPKX, CPX  Lab Results   Component Value Date/Time    Sodium 141 10/19/2022 10:19 AM    Potassium 4.6 10/19/2022 10:19 AM    Chloride 104 10/19/2022 10:19 AM    CO2 23 10/19/2022 10:19 AM    Anion gap 12 05/20/2022 12:49 PM    Glucose 88 10/19/2022 10:19 AM    BUN 9 10/19/2022 10:19 AM    Creatinine 0.88 10/19/2022 10:19 AM    BUN/Creatinine ratio 10 10/19/2022 10:19 AM    GFR est AA >60 05/20/2022 12:49 PM    GFR est non-AA >60 05/20/2022 12:49 PM    Calcium 9.9 10/19/2022 10:19 AM    Bilirubin, total 0.2 10/19/2022 10:19 AM    Alk. phosphatase 65 10/19/2022 10:19 AM    Protein, total 6.9 10/19/2022 10:19 AM    Albumin 4.7 10/19/2022 10:19 AM    Globulin 3.5 05/20/2022 12:49 PM    A-G Ratio 2.1 10/19/2022 10:19 AM    ALT (SGPT) 21 10/19/2022 10:19 AM       EKG:  NSR        Assessment:          ICD-10-CM ICD-9-CM    1. Tachycardia  R00.0 785.0 AMB POC EKG ROUTINE W/ 12 LEADS, INTER & REP      2. Intermittent palpitations  R00.2 785.1 AMB POC EKG ROUTINE W/ 12 LEADS, INTER & REP      3. Pure hypercholesterolemia  E78.00 272.0 AMB POC EKG ROUTINE W/ 12 LEADS, INTER & REP      4. Mild intermittent asthma without complication  R48.27 581.20 AMB POC EKG ROUTINE W/ 12 LEADS, INTER & REP      5. Anxiety as acute reaction to exceptional stress  F41.1 308.0 AMB POC EKG ROUTINE W/ 12 LEADS, INTER & REP    F43.0        6.  Atypical chest pain  R07.89 786.59 AMB POC EKG ROUTINE W/ 12 LEADS, INTER & REP      7. Family history of ischemic heart disease  Z82.49 V17.3 AMB POC EKG ROUTINE W/ 12 LEADS, INTER & REP      8. Cigarette smoker  F17.210 305.1 AMB POC EKG ROUTINE W/ 12 LEADS, INTER & REP      9. SOB (shortness of breath)  R06.02 786.05           Orders Placed This Encounter    AMB POC EKG ROUTINE W/ 12 LEADS, INTER & REP     Order Specific Question:   Reason for Exam:     Answer:   Iregular heart rate        Plan:     Atypical cp  Await stress test    BP controlled    Intermittent palpitation  TSH/CBC/[place] 10/2022 ok  Denies excessive caffeine or stimulant intake  Await echo  Defers monitor as no recurrent palpitation. She will call and let us know if symptom recurs      HLD  10/2022  Labs and lipids per PCP    Asthma  On inhalers    Anxiety  On effexor    Current 0.5 PPD smoker  Tobacco cessation discussed with pt - 5 mins spent  Trial OTC nicotine lozenge    Patient was made aware during visit today that all testing completed would be instantaneously available on their MyChart for review. Discussed that these results will be made available to the provider at the same time. They were advised to wait at least 3 business days to allow for provider's interpretation of results with follow-up before calling our office with concerns about their results. Continue current care and f/u in 6 months.         Michael Washington NP

## 2022-12-15 ENCOUNTER — OFFICE VISIT (OUTPATIENT)
Dept: CARDIOLOGY CLINIC | Age: 32
End: 2022-12-15
Payer: MEDICAID

## 2022-12-15 VITALS
OXYGEN SATURATION: 97 % | TEMPERATURE: 97.5 F | RESPIRATION RATE: 20 BRPM | HEART RATE: 77 BPM | BODY MASS INDEX: 22.98 KG/M2 | SYSTOLIC BLOOD PRESSURE: 106 MMHG | HEIGHT: 59 IN | WEIGHT: 114 LBS | DIASTOLIC BLOOD PRESSURE: 80 MMHG

## 2022-12-15 DIAGNOSIS — F17.210 CIGARETTE SMOKER: ICD-10-CM

## 2022-12-15 DIAGNOSIS — R00.0 TACHYCARDIA: Primary | ICD-10-CM

## 2022-12-15 DIAGNOSIS — R07.89 ATYPICAL CHEST PAIN: ICD-10-CM

## 2022-12-15 DIAGNOSIS — E78.00 PURE HYPERCHOLESTEROLEMIA: ICD-10-CM

## 2022-12-15 DIAGNOSIS — F41.1 ANXIETY AS ACUTE REACTION TO EXCEPTIONAL STRESS: ICD-10-CM

## 2022-12-15 DIAGNOSIS — J45.20 MILD INTERMITTENT ASTHMA WITHOUT COMPLICATION: ICD-10-CM

## 2022-12-15 DIAGNOSIS — F43.0 ANXIETY AS ACUTE REACTION TO EXCEPTIONAL STRESS: ICD-10-CM

## 2022-12-15 DIAGNOSIS — Z82.49 FAMILY HISTORY OF ISCHEMIC HEART DISEASE: ICD-10-CM

## 2022-12-15 DIAGNOSIS — R06.02 SOB (SHORTNESS OF BREATH): ICD-10-CM

## 2022-12-15 DIAGNOSIS — R00.2 INTERMITTENT PALPITATIONS: ICD-10-CM

## 2022-12-15 NOTE — PROGRESS NOTES
Identified pt with two pt identifiers(name and ). Reviewed record in preparation for visit and have obtained necessary documentation. Chief Complaint   Patient presents with    New Patient    Cholesterol Problem    Irregular Heart Beat     Tachycardia        Vitals:    12/15/22 1325   BP: 106/80   Pulse: 77   Resp: 20   Temp: 97.5 °F (36.4 °C)   TempSrc: Temporal   SpO2: 97%   Weight: 114 lb (51.7 kg)   Height: 4' 11\" (1.499 m)   PainSc:   0 - No pain       Medications reviewed/approved by provider. Health Maintenance Review: Patient reminded of \"due or due soon\" health maintenance. I have asked the patient to contact his/her primary care provider (PCP) for follow-up on his/her health maintenance. Coordination of Care Questionnaire:  :   1) Have you been to an emergency room, urgent care, or hospitalized since your last visit? If yes, where when, and reason for visit? no       2. Have seen or consulted any other health care provider since your last visit? If yes, where when, and reason for visit? NO      Patient is accompanied by self I have received verbal consent from Argelia Schreiber to discuss any/all medical information while they are present in the room.

## 2022-12-29 ENCOUNTER — VIRTUAL VISIT (OUTPATIENT)
Dept: FAMILY MEDICINE CLINIC | Age: 32
End: 2022-12-29
Payer: MEDICAID

## 2022-12-29 DIAGNOSIS — J00 ACUTE NASOPHARYNGITIS: Primary | ICD-10-CM

## 2022-12-29 PROCEDURE — 99442 PR PHYS/QHP TELEPHONE EVALUATION 11-20 MIN: CPT | Performed by: FAMILY MEDICINE

## 2022-12-29 RX ORDER — PSEUDOEPHEDRINE HYDROCHLORIDE 60 MG/1
60 TABLET ORAL
Qty: 40 TABLET | Refills: 0 | Status: SHIPPED | OUTPATIENT
Start: 2022-12-29 | End: 2023-01-08

## 2022-12-29 RX ORDER — PREDNISONE 20 MG/1
TABLET ORAL
Qty: 15 TABLET | Refills: 0 | Status: SHIPPED | OUTPATIENT
Start: 2022-12-29

## 2022-12-29 RX ORDER — IPRATROPIUM BROMIDE 42 UG/1
1 SPRAY, METERED NASAL 4 TIMES DAILY
Qty: 15 ML | Refills: 0 | Status: SHIPPED | OUTPATIENT
Start: 2022-12-29

## 2022-12-29 RX ORDER — AZITHROMYCIN 250 MG/1
TABLET, FILM COATED ORAL
Qty: 6 TABLET | Refills: 0 | Status: SHIPPED | OUTPATIENT
Start: 2022-12-29 | End: 2023-01-03

## 2022-12-29 NOTE — PROGRESS NOTES
Jackie Vance is a 34 y.o. female, evaluated via audio-only technology on 12/29/2022 for Cold Symptoms and Sore Throat  . Assessment & Plan:   Diagnoses and all orders for this visit:    1. Acute nasopharyngitis  -     azithromycin (ZITHROMAX) 250 mg tablet; Take 2 tablets today, then take 1 tablet daily  -     pseudoephedrine (SUDAFED) 60 mg tablet; Take 1 Tablet by mouth every six (6) hours as needed for Congestion for up to 10 days. -     ipratropium (ATROVENT) 42 mcg (0.06 %) nasal spray; 1 [place] by Both Nostrils route four (4) times daily. Indications: runny nose  -     predniSONE (DELTASONE) 20 mg tablet; Take 5 tablets day one, take 4 tablets day two, take 3 tablets day three, take 2 tablets day four, take 1 tablet day five. 12  Subjective:   Patient had a 2-day history of severely sore throat and sweats and feverishness and now with congestion and cough. COVID test done earlier this day was negative for COVID 19. Prior to Admission medications    Medication Sig Start Date End Date Taking? Authorizing Provider   azithromycin (ZITHROMAX) 250 mg tablet Take 2 tablets today, then take 1 tablet daily 12/29/22 1/3/23 Yes Lidia Rolon MD   pseudoephedrine (SUDAFED) 60 mg tablet Take 1 Tablet by mouth every six (6) hours as needed for Congestion for up to 10 days. 12/29/22 1/8/23 Yes Lidia Rolon MD   ipratropium (ATROVENT) 42 mcg (0.06 %) nasal spray 1 [place] by Both Nostrils route four (4) times daily. Indications: runny nose 12/29/22  Yes Lidia Rolon MD   predniSONE (DELTASONE) 20 mg tablet Take 5 tablets day one, take 4 tablets day two, take 3 tablets day three, take 2 tablets day four, take 1 tablet day five. 12/29/22  Yes Lidia Rolon MD   venlafaxine-SR [facility] 75 mg capsule Take 1 pill by mouth daily. 11/17/22   Yesi Mejia NP   ibuprofen (MOTRIN) 600 mg tablet Take 1 Tablet by mouth every six (6) hours as needed for Pain.  10/11/22   Angel, Marva Kumar MD   Cetirizine (ZyrTEC) 10 mg cap Take  by mouth daily. Provider, Historical   ProAir HFA 90 mcg/actuation inhaler INHALE 2 PUFFS BY MOUTH EVERY 4 HOURS AS NEEDED FOR WHEEZING FOR SHORTNESS OF BREATH 4/13/22   Silvia Kwon NP   fluticasone propionate (FLONASE) 50 mcg/actuation nasal spray 2 Sprays by Both Nostrils route daily. 4/13/22   Silvia Kwon NP   montelukast (SINGULAIR) 10 mg tablet Take 1 Tablet by mouth daily. 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    Poor dentition K08.9     Patient Active Problem List    Diagnosis Date Noted    Pure hypercholesterolemia 10/21/2022    Anxiety as acute reaction to exceptional stress 10/21/2022    Caregiver burden 10/21/2022    Congenital malposition of esophagus 10/21/2022    Poor dentition 10/21/2022    Mild intermittent asthma without complication 78/10/4525     Current Outpatient Medications   Medication Sig Dispense Refill    azithromycin (ZITHROMAX) 250 mg tablet Take 2 tablets today, then take 1 tablet daily 6 Tablet 0    pseudoephedrine (SUDAFED) 60 mg tablet Take 1 Tablet by mouth every six (6) hours as needed for Congestion for up to 10 days. 40 Tablet 0    ipratropium (ATROVENT) 42 mcg (0.06 %) nasal spray 1 [place] by Both Nostrils route four (4) times daily. Indications: runny nose 15 mL 0    predniSONE (DELTASONE) 20 mg tablet Take 5 tablets day one, take 4 tablets day two, take 3 tablets day three, take 2 tablets day four, take 1 tablet day five. 15 Tablet 0    venlafaxine-SR (EFFEXOR-XR) 75 mg capsule Take 1 pill by mouth daily. 90 Capsule 0    ibuprofen (MOTRIN) 600 mg tablet Take 1 Tablet by mouth every six (6) hours as needed for Pain.  20 Tablet 0    Cetirizine (ZyrTEC) 10 mg cap Take  by mouth daily. ProAir HFA 90 mcg/actuation inhaler INHALE 2 PUFFS BY MOUTH EVERY 4 HOURS AS NEEDED FOR WHEEZING FOR SHORTNESS OF BREATH 9 g 1    fluticasone propionate (FLONASE) 50 mcg/actuation nasal spray 2 Sprays by Both Nostrils route daily. 1 Each 5    montelukast (SINGULAIR) 10 mg tablet Take 1 Tablet by mouth daily. 90 Tablet 3     Allergies   Allergen Reactions    Zoloft [Sertraline] Other (comments)     tachycardia    Cephalexin Swelling     Past Medical History:   Diagnosis Date    Abnormal Pap smear     Anxiety as acute reaction to exceptional stress 10/21/2022    Unspecified epilepsy without mention of intractable epilepsy     4 seizures in past 3 mos     Past Surgical History:   Procedure Laterality Date    HX GYN      csection. HX TUBAL LIGATION      [place] ABDOMEN SURGERY PROC UNLISTED      niessen surgery and PEG tube as child. [place]  DELIVERY ONLY  8-0     Family History   Problem Relation Age of Onset    Stroke Mother     Cancer Maternal Aunt     Diabetes Maternal Grandmother     Heart Disease Maternal Grandmother     Hypertension Maternal Grandmother     Heart Disease Maternal Grandfather      Social History     Tobacco Use    Smoking status: Every Day     Packs/day: 1.00     Years: 3.00     Pack years: 3.00     Types: Cigarettes    Smokeless tobacco: Never   Substance Use Topics    Alcohol use: No       Review of Systems   Constitutional:  Positive for malaise/fatigue. Negative for chills and fever. HENT:  Positive for congestion and sore throat. Negative for ear discharge, ear pain, hearing loss, nosebleeds and tinnitus. Eyes:  Negative for blurred vision, double vision, photophobia and discharge. Respiratory:  Positive for cough. Negative for sputum production, shortness of breath, wheezing and stridor. Cardiovascular:  Negative for chest pain, palpitations, orthopnea and PND.    Gastrointestinal:  Negative for abdominal pain, diarrhea, heartburn, nausea and vomiting. Genitourinary:  Negative for dysuria, frequency and urgency. Musculoskeletal:  Negative for myalgias and neck pain. Skin:  Negative for itching and rash. Neurological:  Negative for dizziness, tingling and headaches. Endo/Heme/Allergies:  Does not bruise/bleed easily. Psychiatric/Behavioral:  Negative for depression. The patient has insomnia. The patient is not nervous/anxious. No data recorded     Lopez Hannah was evaluated through a patient-initiated, synchronous (real-time) audio only encounter. She (or guardian if applicable) is aware that it is a billable service, which includes applicable co-pays, with coverage as determined by her insurance carrier. This visit was conducted with the patient's (and/or Steve Cleaves guardian's) verbal consent. She has not had a related appointment within my department in the past 7 days or scheduled within the next 24 hours. The patient was located in a state where the provider was licensed to provide care. The patient was located at: Home: [address] [place] [address]  The provider was located at:  Facility (Appt Department): [address] [place] [phone]    Total Time: minutes: 11-20 minutes    Shana Ortega MD

## 2022-12-29 NOTE — PROGRESS NOTES
1. \"Have you been to the ER, urgent care clinic since your last visit? Hospitalized since your last visit? \" No    2. \"Have you seen or consulted any other health care providers outside of the [address] [place] [address] since your last visit? \" No     3. For patients aged 39-70: Has the patient had a colonoscopy / FIT/ Cologuard? NA - based on age     If the patient is female:    4. For patients aged 41-77: Has the patient had a mammogram within the past 2 years? NA - based on age    11. For patients aged 21-65: Has the patient had a pap smear?  NA - based on age

## 2023-01-12 ENCOUNTER — DOCUMENTATION ONLY (OUTPATIENT)
Dept: FAMILY MEDICINE CLINIC | Age: 33
End: 2023-01-12

## 2023-01-12 DIAGNOSIS — R00.0 TACHYCARDIA: ICD-10-CM

## 2023-01-12 DIAGNOSIS — I95.9 HYPOTENSION, UNSPECIFIED HYPOTENSION TYPE: ICD-10-CM

## 2023-01-12 DIAGNOSIS — R06.02 SOB (SHORTNESS OF BREATH): ICD-10-CM

## 2023-02-01 ENCOUNTER — TELEPHONE (OUTPATIENT)
Dept: FAMILY MEDICINE CLINIC | Age: 33
End: 2023-02-01

## 2023-02-01 NOTE — TELEPHONE ENCOUNTER
Patient called stating she misplaced her effexor and needs a refill.  Patient has been made aware that you might not refill this and if you did it's not going to be covered by her insurance

## 2023-02-13 ENCOUNTER — OFFICE VISIT (OUTPATIENT)
Dept: FAMILY MEDICINE CLINIC | Age: 33
End: 2023-02-13
Payer: MEDICAID

## 2023-02-13 VITALS
BODY MASS INDEX: 23.66 KG/M2 | HEART RATE: 86 BPM | RESPIRATION RATE: 18 BRPM | DIASTOLIC BLOOD PRESSURE: 79 MMHG | SYSTOLIC BLOOD PRESSURE: 117 MMHG | TEMPERATURE: 97.1 F | HEIGHT: 59 IN | WEIGHT: 117.38 LBS

## 2023-02-13 DIAGNOSIS — F43.0 ANXIETY AS ACUTE REACTION TO EXCEPTIONAL STRESS: ICD-10-CM

## 2023-02-13 DIAGNOSIS — Z23 ENCOUNTER FOR IMMUNIZATION: ICD-10-CM

## 2023-02-13 DIAGNOSIS — F41.1 ANXIETY AS ACUTE REACTION TO EXCEPTIONAL STRESS: ICD-10-CM

## 2023-02-13 DIAGNOSIS — J45.20 MILD INTERMITTENT ASTHMA WITHOUT COMPLICATION: Primary | ICD-10-CM

## 2023-02-13 DIAGNOSIS — E78.00 PURE HYPERCHOLESTEROLEMIA: ICD-10-CM

## 2023-02-13 PROCEDURE — 99214 OFFICE O/P EST MOD 30 MIN: CPT | Performed by: NURSE PRACTITIONER

## 2023-02-13 PROCEDURE — 90677 PCV20 VACCINE IM: CPT | Performed by: NURSE PRACTITIONER

## 2023-02-13 RX ORDER — VENLAFAXINE HYDROCHLORIDE 75 MG/1
CAPSULE, EXTENDED RELEASE ORAL
Qty: 90 CAPSULE | Refills: 1 | Status: SHIPPED | OUTPATIENT
Start: 2023-02-13

## 2023-02-13 RX ORDER — ALBUTEROL SULFATE 90 UG/1
2 AEROSOL, METERED RESPIRATORY (INHALATION)
Qty: 18 G | Refills: 0 | Status: SHIPPED | OUTPATIENT
Start: 2023-02-13

## 2023-02-13 RX ORDER — ALBUTEROL SULFATE 90 UG/1
AEROSOL, METERED RESPIRATORY (INHALATION)
Qty: 9 G | Refills: 1 | Status: SHIPPED | OUTPATIENT
Start: 2023-02-13 | End: 2023-02-13

## 2023-02-13 NOTE — PROGRESS NOTES
Subjective:     CC: asthma    Jasper Goldberg is a 34 y.o. female who presents today for a 3 month follow up for asthma and other chronic medical issues. Asthma r/t allergies  She is on Singulair , Cetirizine, and Flonase daily with Albuterol prn, uses it about once a week. Denies any coughing, wheezing, or SOB today. She does smoke daily. Anxiety r/t to exceptional stress  She has been taking care of her mother and  who have multiple health issues. She also has to care for her 2 young sons. Her older son has ADD. They also have lots of pets. She anxiety is currently under control with the Effexor 75mg daily. Of note she has tried Zoloft in the past  but had an allergic reaction to it, states her heart started racing. She also mentions she has a hx of ADD but is not currently taking anything for it. HLD  Lab Results   Component Value Date/Time    Cholesterol, total 199 10/19/2022 10:19 AM    HDL Cholesterol 58 10/19/2022 10:19 AM    LDL, calculated 123 (H) 10/19/2022 10:19 AM    LDL, calculated 117 (H) 04/19/2019 09:09 AM    VLDL, calculated 18 10/19/2022 10:19 AM    VLDL, calculated 9 04/19/2019 09:09 AM    Triglyceride 103 10/19/2022 10:19 AM    CHOL/HDL Ratio 2.7 03/15/2018 08:28 AM     After the last appt she was advised to work on a low fat diet. Will recheck level this fall. 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 At her last OV she was c/o dysphagia and GERD and and was referred to Gi. States she never made an appt because her symptoms resolved on their own. Poor dentition  She has had a lot of her teeth pulled over the past year, states she still has 12 more to go. Health maintenance  PAP- states this was done in 2021.     Mammo- no FH of breast cancer in mom or siblings. Colonoscopy- no FH of colon cancer in parents or siblings. Prevnar 20- due, given today  Covid vaccine-rec'd both Pfizer vaccines in 2021, boosted in . 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 Indications: runny nose, Disp: 15 mL, Rfl: 0    predniSONE (DELTASONE) 20 mg tablet, Take 5 tablets day one, take 4 tablets day two, take 3 tablets day three, take 2 tablets day four, take 1 tablet day five., Disp: 15 Tablet, Rfl: 0    venlafaxine-SR (EFFEXOR-XR) 75 mg capsule, Take 1 pill by mouth daily. , Disp: 90 Capsule, Rfl: 0    ibuprofen (MOTRIN) 600 mg tablet, Take 1 Tablet by mouth every six (6) hours as needed for Pain., Disp: 20 Tablet, Rfl: 0    Cetirizine (ZyrTEC) 10 mg cap, Take  by mouth daily. , Disp: , Rfl:     ProAir HFA 90 mcg/actuation inhaler, INHALE 2 PUFFS BY MOUTH EVERY 4 HOURS AS NEEDED FOR WHEEZING FOR SHORTNESS OF BREATH, Disp: 9 g, Rfl: 1    fluticasone propionate (FLONASE) 50 mcg/actuation nasal spray, 2 Sprays by Both Nostrils route daily. , Disp: 1 Each, Rfl: 5    montelukast (SINGULAIR) 10 mg tablet, Take 1 Tablet by mouth daily. , Disp: 90 Tablet, Rfl: 3    Allergies   Allergen Reactions    Zoloft [Sertraline] Other (comments)     tachycardia    Cephalexin Swelling       Past Surgical History:   Procedure Laterality Date    HX GYN      csection. HX TUBAL LIGATION      [place] ABDOMEN SURGERY PROC UNLISTED      niessen surgery and PEG tube as child.     [place]  DELIVERY ONLY  -       Social History     Tobacco Use   Smoking Status Every Day    Packs/day: 1.00    Years: 3.00    Pack years: 3.00    Types: Cigarettes   Smokeless Tobacco Never       Social History     Socioeconomic History    Marital status:    Tobacco Use    Smoking status: Every Day     Packs/day: 1.00     Years: 3.00     Pack years: 3.00     Types: Cigarettes    Smokeless tobacco: Never   Vaping Use    Vaping Use: Never used   Substance and Sexual Activity    Alcohol use: No    Drug use: Never    Sexual activity: Yes     Partners: Male     Birth control/protection: Pill, Condom, Surgical   Other Topics Concern     Service No    Blood Transfusions Yes    Caffeine Concern Yes    Occupational Exposure No    Hobby Hazards No    Sleep Concern No    Stress Concern No    Weight Concern Yes    Special Diet No    Back Care Yes    Exercise Yes    Seat Belt Yes    Self-Exams Yes     Social Determinants of Health     Financial Resource Strain: Low Risk     Difficulty of Paying Living Expenses: Not hard at all   Food Insecurity: No Food Insecurity    Worried About Running Out of Food in the Last Year: Never true    Ran Out of Food in the Last Year: Never true   Transportation Needs: No Transportation Needs    Lack of Transportation (Medical): No    Lack of Transportation (Non-Medical): No       Family History   Problem Relation Age of Onset    Stroke Mother     Cancer Maternal Aunt     Diabetes Maternal Grandmother     Heart Disease Maternal Grandmother     Hypertension Maternal Grandmother     Heart Disease Maternal Grandfather        ROS:  Gen: denies fever, chills, or fatigue  HEENT: +poor dentition, missing all upper teeth, denies H/A, nasal congestion, ear pain, or sore throat  Resp: denies dyspnea, cough, or wheezing  CV: denies CP, pressure, palpitations, syncope  Extremeties: denies edema  GI[de-identified] denies dysphagia, dyspepsia, abdominal pain, nausea, vomiting, diarrhea, or constipation  GYN: +heavy irreg menses  Musculoskeletal: +chronic intermittent left knee pain  Neuro: denies numbness/tingling or dizziness  Skin: denies rashes or new lesions   Psych: + anxiety- well controlled, denies depression, alek, or other changes in mood      Objective:     Visit Vitals  /79 (BP 1 Location: Left upper arm)   Pulse 86   Temp 97.1 °F (36.2 °C) (Temporal)   Resp 18   Ht 4' 11\" (1.499 m)   Wt 117 lb 6 oz (53.2 kg)   BMI 23.71 kg/m²       General: Alert and oriented. No acute distress. Well nourished. HEENT :  Eyes: Sclera white, conjunctiva clear. PERRLA. Extra ocular movements intact. Mouth: Pharynx non-erythematous, without exudate. +missing all upper teeth and bottom molars, the remaining bottom teeth are decayed   Neck: Supple with FROM. Lungs: Breathing even and unlabored. All lobes clear to auscultation bilaterally   Heart :RRR, S1 and S2 normal intensity, no extra heart sounds  Extremities: Non-edematous, no pallor or cyanosis  Neuro: Cranial nerves grossly normal.  Psych: Mood and thought content appropriate for situation. Dressed appropriately and with good hygiene. Skin: Warm, dry, and intact. No lesions or discoloration. Assessment/ Plan:     Asthma  Stable  Cont current meds  Avoid triggers  F/U 3 months or sooner prn worsening symptoms    Anxiety r/t to exceptional stress  Well controlled  Cont Effexor 75mg daily    HLD  Cont to work on low fat diet  Will recheck lipids in another 6 months    Poor dentition  F/U with the dentist as scheduled    Dysphagia and GERD with hx of congenital malposition of esophagus  resolved     Health maintenance  PAP- states this was done in 2021. Mammo- no FH of breast cancer in mom or siblings. Colonoscopy- no FH of colon cancer in parents or siblings.   Prevnar 20- due, given today  Covid vaccine-rec'd both Pfizer vaccines in 2021, boosted in . States she had another booster last year- will check VIIS for exact date        Verbal and written instructions (see AVS) provided. Patient expresses understanding of diagnosis and treatment plan. Health Maintenance Due   Topic Date Due    Pneumococcal 0-64 years (2 - PCV) 01/31/2021    COVID-19 Vaccine (4 - Booster for Pfizer series) 01/17/2022    Flu Vaccine (1) 08/01/2022               Brenda ASHLEY.  Gracia Chowdhury NP

## 2023-02-13 NOTE — PROGRESS NOTES
After obtaining consent, and per orders ofNP, injection of shingrix given by Justine Griffin LPN. Patient instructed to remain in clinic for 20 minutes afterwards, and to report any adverse reaction to me immediately.

## 2023-02-13 NOTE — PROGRESS NOTES
1. \"Have you been to the ER, urgent care clinic since your last visit? Hospitalized since your last visit? \" No    2. \"Have you seen or consulted any other health care providers outside of the [address] [place] [address] since your last visit? \" Yes When: 1-11-23   [facility]     3. For patients aged 39-70: Has the patient had a colonoscopy / FIT/ Cologuard? NA - based on age      If the patient is female:    4. For patients aged 41-77: Has the patient had a mammogram within the past 2 years? NA - based on age or sex      11. For patients aged 21-65: Has the patient had a pap smear?  Yes - no [place] present

## 2023-02-22 ENCOUNTER — TELEPHONE (OUTPATIENT)
Dept: FAMILY MEDICINE CLINIC | Age: 33
End: 2023-02-22

## 2023-02-22 ENCOUNTER — HOSPITAL ENCOUNTER (EMERGENCY)
Age: 33
Discharge: HOME | End: 2023-02-22
Attending: EMERGENCY MEDICINE
Payer: MEDICAID

## 2023-02-22 VITALS
WEIGHT: 117 LBS | OXYGEN SATURATION: 99 % | SYSTOLIC BLOOD PRESSURE: 124 MMHG | HEART RATE: 76 BPM | TEMPERATURE: 98.1 F | BODY MASS INDEX: 23.59 KG/M2 | RESPIRATION RATE: 18 BRPM | HEIGHT: 59 IN | DIASTOLIC BLOOD PRESSURE: 73 MMHG

## 2023-02-22 DIAGNOSIS — K04.7 DENTAL INFECTION: Primary | ICD-10-CM

## 2023-02-22 PROCEDURE — 99283 EMERGENCY DEPT VISIT LOW MDM: CPT

## 2023-02-22 RX ORDER — CHLORHEXIDINE GLUCONATE 1.2 MG/ML
15 RINSE ORAL EVERY 12 HOURS
Qty: 420 ML | Refills: 0 | Status: SHIPPED | OUTPATIENT
Start: 2023-02-22 | End: 2023-03-08

## 2023-02-22 RX ORDER — AMOXICILLIN AND CLAVULANATE POTASSIUM 875; 125 MG/1; MG/1
1 TABLET, FILM COATED ORAL 2 TIMES DAILY
Qty: 14 TABLET | Refills: 0 | Status: SHIPPED | OUTPATIENT
Start: 2023-02-22

## 2023-02-22 NOTE — ED TRIAGE NOTES
Pt arrived by POV for dental abscess,  pt reports she had several teeth removed and now feels like she has an abscess, pt is requesting an antibiotic.   Pt is awake alert and oriented x4  pt educated on ER flow

## 2023-02-22 NOTE — TELEPHONE ENCOUNTER
Pt states she recently had 8 teeth pulled on top and she now believes its infected. She is wondering if Kike Manny would be willing to call in an antibiotic. If she needs an appt can she do a VV? Can we double book with another VV later on today?

## 2023-02-22 NOTE — ED PROVIDER NOTES
EMERGENCY DEPARTMENT HISTORY AND PHYSICAL EXAM          Date: 2023  Patient Name: Saniya Damian    History of Presenting Illness     Chief Complaint   Patient presents with    Dental Pain       History Provided By: Patient    HPI: Saniya Damian is a 34 y.o. female, pmhx listed below, who presents to the ED c/o left upper jawline swelling and pain. Patient reports spontaneous drainage of pus this morning immediately prior to coming to the emergency department. States she has had issues with upper jawline since removal of multiple teeth in 2022. Has been on antibiotics twice. Has not returned to see her dentist.  No fever. PCP: Annabelle Colunga NP        Past History       Past Medical History:  Past Medical History:   Diagnosis Date    Abnormal Pap smear     Anxiety as acute reaction to exceptional stress 10/21/2022    Unspecified epilepsy without mention of intractable epilepsy     4 seizures in past 3 mos       Past Surgical History:  Past Surgical History:   Procedure Laterality Date    HX GYN      csection. HX TUBAL LIGATION      [place]  DELIVERY ONLY      [place] UNLISTED PROCEDURE ABDOMEN PERITONEUM & OMENTUM      niessen surgery and PEG tube as child. Family History:  Family History   Problem Relation Age of Onset    Stroke Mother     Cancer Maternal Aunt     Diabetes Maternal Grandmother     Heart Disease Maternal Grandmother     Hypertension Maternal Grandmother     Heart Disease Maternal Grandfather        Social History:  Social History     Tobacco Use    Smoking status: Every Day     Packs/day: 1.00     Years: 3.00     Pack years: 3.00     Types: Cigarettes    Smokeless tobacco: Never   Vaping Use    Vaping Use: Never used   Substance Use Topics    Alcohol use: No    Drug use: Never       Physical Exam     Vital Signs-Reviewed the patient's vital signs.   Patient Vitals for the past 12 hrs:   Temp Pulse Resp BP SpO2   23 1059 98.1 °F (36.7 °C) 76 18 124/73 99 %       Physical Exam  Constitutional:       Appearance: Normal appearance. HENT:      Head: Normocephalic and atraumatic. Comments: Poor dentition with multiple teeth missing from prior dental surgeries. Draining sinus noted in right upper jawline with associated gumline tenderness. No facial swelling. Mouth/Throat:      Mouth: Mucous membranes are moist.   Skin:     General: Skin is warm and dry. Neurological:      Mental Status: She is alert and oriented to person, place, and time. Psychiatric:         Mood and Affect: Mood normal.       Diagnostic Study Results     Labs -   No results found for this or any previous visit (from the past 12 hour(s)). Radiologic Studies -   No orders to display     CT Results  (Last 48 hours)      None          CXR Results  (Last 48 hours)      None                Medical Decision Making   I am the first provider for this patient. I reviewed the vital signs, available nursing notes, past medical history, past surgical history, family history and social history. Records Reviewed: Prior medical records    Provider Notes (Medical Decision Making):   MDM: 50-year-old female with left upper jawline/gumline infection. No dentition noted in the area. Patient will need to follow-up with her dentist for further evaluation but will initiate antibiotics and chlorhexidine mouthwashes now. Updated pt on all final results. Will follow up as instructed. All questions have been answered, pt voiced understanding and agreement with plan. Specific return precautions provided as well as instructions to return to the ED should sx worsen at any time. Vital signs stable for discharge. Diagnosis     Clinical Impression:   1.  Dental infection            Disposition:  Discharged    Current Discharge Medication List        START taking these medications    Details   chlorhexidine (PERIDEX) 0.12 % solution 15 mL by Swish and Spit route every twelve (12) hours for 14 days.  Christel Vernell: 420 mL, Refills: 0  Start date: 2/22/2023, End date: 3/8/2023      amoxicillin-clavulanate (Augmentin) 875-125 mg per tablet Take 1 Tablet by mouth two (2) times a day. Qty: 14 Tablet, Refills: 0  Start date: 2/22/2023               Please note, this dictation was completed with [employer], the computer voice recognition software. Quite often unanticipated grammatical, syntax, homophones, and other interpretive errors are inadvertently transcribed by the computer software. Please disregard these errors. Please excuse any errors that have escaped final proof reading.

## 2023-05-18 ENCOUNTER — OFFICE VISIT (OUTPATIENT)
Age: 33
End: 2023-05-18
Payer: MEDICAID

## 2023-05-18 VITALS
SYSTOLIC BLOOD PRESSURE: 122 MMHG | OXYGEN SATURATION: 97 % | RESPIRATION RATE: 16 BRPM | TEMPERATURE: 97.9 F | HEART RATE: 72 BPM | BODY MASS INDEX: 24.39 KG/M2 | WEIGHT: 121 LBS | HEIGHT: 59 IN | DIASTOLIC BLOOD PRESSURE: 79 MMHG

## 2023-05-18 DIAGNOSIS — K08.9 POOR DENTITION: ICD-10-CM

## 2023-05-18 DIAGNOSIS — J45.20 MILD INTERMITTENT ASTHMA, UNCOMPLICATED: Primary | ICD-10-CM

## 2023-05-18 DIAGNOSIS — E78.00 PURE HYPERCHOLESTEROLEMIA, UNSPECIFIED: ICD-10-CM

## 2023-05-18 DIAGNOSIS — F43.0 ACUTE STRESS REACTION: ICD-10-CM

## 2023-05-18 PROCEDURE — 99214 OFFICE O/P EST MOD 30 MIN: CPT | Performed by: NURSE PRACTITIONER

## 2023-05-18 RX ORDER — CHLORHEXIDINE GLUCONATE 0.12 MG/ML
15 RINSE ORAL EVERY 12 HOURS PRN
COMMUNITY
Start: 2023-02-22

## 2023-05-18 RX ORDER — PSEUDOEPHEDRINE HCL 30 MG
100 TABLET ORAL 2 TIMES DAILY
COMMUNITY
Start: 2016-03-02

## 2023-05-18 SDOH — ECONOMIC STABILITY: FOOD INSECURITY: WITHIN THE PAST 12 MONTHS, YOU WORRIED THAT YOUR FOOD WOULD RUN OUT BEFORE YOU GOT MONEY TO BUY MORE.: NEVER TRUE

## 2023-05-18 SDOH — ECONOMIC STABILITY: HOUSING INSECURITY
IN THE LAST 12 MONTHS, WAS THERE A TIME WHEN YOU DID NOT HAVE A STEADY PLACE TO SLEEP OR SLEPT IN A SHELTER (INCLUDING NOW)?: NO

## 2023-05-18 SDOH — ECONOMIC STABILITY: FOOD INSECURITY: WITHIN THE PAST 12 MONTHS, THE FOOD YOU BOUGHT JUST DIDN'T LAST AND YOU DIDN'T HAVE MONEY TO GET MORE.: NEVER TRUE

## 2023-05-18 SDOH — ECONOMIC STABILITY: INCOME INSECURITY: HOW HARD IS IT FOR YOU TO PAY FOR THE VERY BASICS LIKE FOOD, HOUSING, MEDICAL CARE, AND HEATING?: NOT VERY HARD

## 2023-05-18 ASSESSMENT — ANXIETY QUESTIONNAIRES
6. BECOMING EASILY ANNOYED OR IRRITABLE: 1
5. BEING SO RESTLESS THAT IT IS HARD TO SIT STILL: 0
7. FEELING AFRAID AS IF SOMETHING AWFUL MIGHT HAPPEN: 0
3. WORRYING TOO MUCH ABOUT DIFFERENT THINGS: 0
2. NOT BEING ABLE TO STOP OR CONTROL WORRYING: 0
4. TROUBLE RELAXING: 1
1. FEELING NERVOUS, ANXIOUS, OR ON EDGE: 1
IF YOU CHECKED OFF ANY PROBLEMS ON THIS QUESTIONNAIRE, HOW DIFFICULT HAVE THESE PROBLEMS MADE IT FOR YOU TO DO YOUR WORK, TAKE CARE OF THINGS AT HOME, OR GET ALONG WITH OTHER PEOPLE: NOT DIFFICULT AT ALL
GAD7 TOTAL SCORE: 3

## 2023-05-18 ASSESSMENT — PATIENT HEALTH QUESTIONNAIRE - PHQ9
2. FEELING DOWN, DEPRESSED OR HOPELESS: 0
SUM OF ALL RESPONSES TO PHQ QUESTIONS 1-9: 0
1. LITTLE INTEREST OR PLEASURE IN DOING THINGS: 0
SUM OF ALL RESPONSES TO PHQ QUESTIONS 1-9: 0
SUM OF ALL RESPONSES TO PHQ QUESTIONS 1-9: 0
SUM OF ALL RESPONSES TO PHQ9 QUESTIONS 1 & 2: 0
SUM OF ALL RESPONSES TO PHQ QUESTIONS 1-9: 0

## 2023-05-18 NOTE — PROGRESS NOTES
1. \"Have you been to the ER, urgent care clinic since your last visit? Hospitalized since your last visit? \" No    2. \"Have you seen or consulted any other health care providers outside of the [address] [place] [address] since your last visit? \" No     3. For patients aged 39-70: Has the patient had a colonoscopy / FIT/ Cologuard? NA - based on age     If the patient is female:    4. For patients aged 41-77: Has the patient had a mammogram within the past 2 years? NA - based on age    11. For patients aged 21-65: Has the patient had a pap smear?  No

## 2023-05-18 NOTE — PROGRESS NOTES
Subjective:     CC: asthma    Jessica Jennings is a 34 y.o. female who presents today for a 3 month follow up for asthma and other chronic medical issues. Asthma r/t allergies  She is on Singulair , Cetirizine, and Flonase daily with Albuterol prn, does not have to use it much at all. Denies any coughing, wheezing, or SOB today. She does smoke daily. Anxiety r/t to exceptional stress  She has been taking care of her mother and  who have multiple health issues. She also has to care for her 2 young sons. Her older son has ADD. They also have lots of pets. Her anxiety is currently under control with the Effexor 75mg daily. Of note she has tried Zoloft in the past  but had an allergic reaction to it, states her heart started racing. She also mentions she has a hx of ADD but is not currently taking anything for it. HLD  Lab Results   Component Value Date    CHOL 199 10/19/2022    TRIG 103 10/19/2022    HDL 58 10/19/2022    LDLCALC 123 (H) 10/19/2022        After the last appt she was advised to work on a low fat diet. Will recheck level this fall. Poor dentition  She has had a lot of her teeth pulled over the past year, states she still has 12 more to go. Health maintenance  PAP- states this was done in 2021. Mammo- no FH of breast cancer in mom or siblings. Colonoscopy- no FH of colon cancer in parents or siblings. Prevnar 20- up to date  Covid vaccine-rec'd both Mccabe Peter vaccines in 2021, boosted in . Rec'd the Bivalent vaccine . Flu shot up to date  Varicella vaccine- did not come out until 1995 and she was born in [facility] so likely never had it. Will check titers with next lab draw.          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

## 2023-08-07 RX ORDER — VENLAFAXINE HYDROCHLORIDE 75 MG/1
CAPSULE, EXTENDED RELEASE ORAL
Qty: 90 CAPSULE | Refills: 0 | Status: SHIPPED | OUTPATIENT
Start: 2023-08-07

## 2023-08-07 RX ORDER — ALBUTEROL SULFATE 90 UG/1
AEROSOL, METERED RESPIRATORY (INHALATION)
Qty: 18 G | Refills: 0 | Status: SHIPPED | OUTPATIENT
Start: 2023-08-07

## 2023-08-07 NOTE — TELEPHONE ENCOUNTER
Patient requesting refill on     Requested Prescriptions     Pending Prescriptions Disp Refills    albuterol sulfate HFA (PROVENTIL;VENTOLIN;PROAIR) 108 (90 Base) MCG/ACT inhaler [Pharmacy Med Name: Albuterol Sulfate  (90 Base) MCG/ACT Inhalation Aerosol Solution] 18 g 0     Sig: INHALE 2 PUFFS BY MOUTH EVERY 4 HOURS AS NEEDED FOR WHEEZING FOR SHORTNESS OF BREATH    venlafaxine (EFFEXOR XR) 75 MG extended release capsule [Pharmacy Med Name: Venlafaxine HCl ER 75 MG Oral Capsule Extended Release 24 Hour] 90 capsule 0     Sig: Take 1 capsule by mouth once daily        Last OV 5/18/2023

## 2023-08-08 NOTE — PROGRESS NOTES
[facility]     [address] [facility], [facility], [address], [address]  [address] Re[address] by [facility], [address] [place] [address]     Subjective:          Ric Smart is a 28 y.o. female is here for routine f/u. Pmhx as posted below. Current 0.5 PPD smoker; + family hx CAD. Referred by PCP for intermittent palpitation, cp and seen by me in 2022      Aristeo Ayoub HPI 2022      At the last OV her BP was very low at 80/60, she reported heart racing off and on and a single episode of chest pain. She denied any dizziness or lightheadedness or swelling. She stated her  grandmother had a heart attack in her 29's   TSH was normal. An ECHO and stress test were ordered. These are scheduled for . BP is better today. Since starting Effexor she states her heart has not been racing as much. Today     No further palpitation since she's been on effexor. Stress test 2023 was fine  Still smoking       The patient denies chest pain/ shortness of breath, orthopnea, PND, LE edema, palpitations, syncope, presyncope or fatigue. Patient Active Problem List    Diagnosis Date Noted    Pure hypercholesterolemia 10/21/2022    Anxiety as acute reaction to exceptional stress 10/21/2022    Caregiver burden 10/21/2022    Congenital malposition of esophagus 10/21/2022    Poor dentition 10/21/2022    Mild intermittent asthma without complication       Aristeo Ayoub, APRN - NP  Past Medical History:   Diagnosis Date    Abnormal Pap smear     Anxiety as acute reaction to exceptional stress 10/21/2022    Unspecified epilepsy without mention of intractable epilepsy     4 seizures in past 3 mos      Past Surgical History:   Procedure Laterality Date     DELIVERY ONLY  5-    GYN      csection. [place] UNLISTED PROCEDURE ABDOMEN PERITONEUM & OMENTUM      niessen surgery and PEG tube as child.     TUBAL LIGATION       Allergies   Allergen Reactions

## 2023-08-11 ENCOUNTER — OFFICE VISIT (OUTPATIENT)
Age: 33
End: 2023-08-11
Payer: MEDICAID

## 2023-08-11 VITALS
HEART RATE: 67 BPM | DIASTOLIC BLOOD PRESSURE: 78 MMHG | HEIGHT: 59 IN | OXYGEN SATURATION: 100 % | TEMPERATURE: 98.8 F | RESPIRATION RATE: 22 BRPM | SYSTOLIC BLOOD PRESSURE: 106 MMHG | BODY MASS INDEX: 24.19 KG/M2 | WEIGHT: 120 LBS

## 2023-08-11 DIAGNOSIS — E78.00 PURE HYPERCHOLESTEROLEMIA, UNSPECIFIED: ICD-10-CM

## 2023-08-11 DIAGNOSIS — R07.89 OTHER CHEST PAIN: ICD-10-CM

## 2023-08-11 DIAGNOSIS — R00.2 PALPITATIONS: ICD-10-CM

## 2023-08-11 DIAGNOSIS — R00.0 TACHYCARDIA, UNSPECIFIED: Primary | ICD-10-CM

## 2023-08-11 DIAGNOSIS — Z82.49 FAMILY HISTORY OF ISCHEMIC HEART DISEASE AND OTHER DISEASES OF THE CIRCULATORY SYSTEM: ICD-10-CM

## 2023-08-11 DIAGNOSIS — F17.200 CURRENT SMOKER: ICD-10-CM

## 2023-08-11 DIAGNOSIS — R06.02 SHORTNESS OF BREATH: ICD-10-CM

## 2023-08-11 PROCEDURE — 99214 OFFICE O/P EST MOD 30 MIN: CPT | Performed by: NURSE PRACTITIONER

## 2023-08-23 ENCOUNTER — OFFICE VISIT (OUTPATIENT)
Age: 33
End: 2023-08-23
Payer: MEDICAID

## 2023-08-23 VITALS
OXYGEN SATURATION: 100 % | WEIGHT: 120.8 LBS | HEART RATE: 71 BPM | HEIGHT: 59 IN | BODY MASS INDEX: 24.35 KG/M2 | DIASTOLIC BLOOD PRESSURE: 82 MMHG | SYSTOLIC BLOOD PRESSURE: 119 MMHG

## 2023-08-23 DIAGNOSIS — J45.21 MILD INTERMITTENT ASTHMA WITH ACUTE EXACERBATION: ICD-10-CM

## 2023-08-23 DIAGNOSIS — Z12.4 SCREENING FOR CERVICAL CANCER: Primary | ICD-10-CM

## 2023-08-23 PROCEDURE — 99214 OFFICE O/P EST MOD 30 MIN: CPT | Performed by: NURSE PRACTITIONER

## 2023-08-23 RX ORDER — PREDNISONE 10 MG/1
TABLET ORAL
Qty: 21 TABLET | Refills: 0 | Status: SHIPPED | OUTPATIENT
Start: 2023-08-23

## 2023-08-30 RX ORDER — ALBUTEROL SULFATE 90 UG/1
AEROSOL, METERED RESPIRATORY (INHALATION)
Qty: 18 G | Refills: 1 | Status: SHIPPED | OUTPATIENT
Start: 2023-08-30

## 2023-09-01 LAB
CYTOLOGIST CVX/VAG CYTO: NORMAL
CYTOLOGY CVX/VAG DOC CYTO: NORMAL
CYTOLOGY CVX/VAG DOC THIN PREP: NORMAL
DX ICD CODE: NORMAL
HPV GENOTYPE REFLEX: NORMAL
HPV I/H RISK 4 DNA CVX QL PROBE+SIG AMP: NEGATIVE
Lab: NORMAL
OTHER STN SPEC: NORMAL
STAT OF ADQ CVX/VAG CYTO-IMP: NORMAL

## 2023-10-02 RX ORDER — ALBUTEROL SULFATE 90 UG/1
AEROSOL, METERED RESPIRATORY (INHALATION)
Qty: 18 G | Refills: 0 | Status: SHIPPED | OUTPATIENT
Start: 2023-10-02

## 2023-10-02 NOTE — TELEPHONE ENCOUNTER
Patient requesting refill on     Requested Prescriptions     Pending Prescriptions Disp Refills    albuterol sulfate HFA (PROVENTIL;VENTOLIN;PROAIR) 108 (90 Base) MCG/ACT inhaler [Pharmacy Med Name: Albuterol Sulfate  (90 Base) MCG/ACT Inhalation Aerosol Solution] 18 g 0     Sig: INHALE 2 PUFFS BY MOUTH EVERY 4 HOURS AS NEEDED FOR WHEEZING FOR SHORTNESS OF BREATH        Last OV 8/23/2023

## 2025-03-11 NOTE — PROGRESS NOTES
Identified pt with two pt identifiers(name and ). Reviewed record in preparation for visit and have obtained necessary documentation. Chief Complaint   Patient presents with    Hypotension     Follow-up       1. \"Have you been to the ER, urgent care clinic since your last visit? Hospitalized since your last visit? \" No    2. \"Have you seen or consulted any other health care providers outside of the [address] [place] [address] since your last visit? \" No     3. For patients aged 39-70: Has the patient had a colonoscopy / FIT/ Cologuard? NA - based on age      If the patient is female:    4. For patients aged 41-77: Has the patient had a mammogram within the past 2 years? NA - based on age or sex      11. For patients aged 21-65: Has the patient had a pap smear?  Yes - no Care Gap present Pediatric Hematology [facility] Oncology Clinic Note    Patient ID: Juarez Zarco is a 16 y.o. 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    Doing well. Slight weakness to lower back but no pain. Walking and voiding normally. Is very anxious with medical procedures (to include blood pressure cuff to arms). Is due for MRI of spine for surveillance.       8/2023:  Juarez is doing well. Denies lower back pain or radiating pain to legs. No weakness or numbness or difficulty walking. No bowel or bladder issues. Had recent MRI in April and f/u WITH NEUROSURGERY.     Initial Hx:   Juarez is a 14-year-old boy who 1st became symptomatic in May 2022 with increasing lower back pain with pain radiating down both buttock and thigh initially right more than left then bilateral.  Worsened with exercise. Patient also had some increased falling.  He was worked up with the spinal imaging which revealed a large intradural mass at L2.  Patient denied any bowel or bladder issues.  He is here to see me following  L1-3 laminectomy w/ L2 intradural extramedullary tumor resection last month performed by Dr. Delgado, Neurosurgery.     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 Feeling well post surgery. No longer have discomfort to buttocks or hip. No nerve issues. Next MRI scheduled has been scheduled end of April according to family. MRI to be performed at [facility] [phone] in [place].      Past medical history:  No past medical history on file.  Past surgical history:   Past Surgical History:   Procedure Laterality Date    LUMBAR LAMINECTOMY N/A 1/9/2023    Procedure: LAMINECTOMY, SPINE, LUMBAR for tumor resection;  Surgeon: Lucio Delgado MD;  Location: [facility] OR [address] [place] [address];  Service: Neurosurgery;  Laterality: N/A;  L1-L3 for L2 tumor    yun table 4 poster, prone, aaron,  neuromonitoring    TYMPANOSTOMY TUBE PLACEMENT Bilateral       Family history:  No family history on file.   Social history:    Social History     Socioeconomic History    Marital status: Single       Review of Systems   Constitutional:  Negative for activity change, appetite change and fatigue.   HENT:  Negative for ear pain, hearing loss and sinus pain.    Eyes:  Negative for pain and visual disturbance.   Respiratory:  Negative for cough, chest tightness and shortness of breath.    Cardiovascular:  Negative for chest pain.   Gastrointestinal:  Negative for abdominal pain, constipation and vomiting.   Endocrine: Negative for cold intolerance.   Genitourinary:  Negative for difficulty urinating.   Musculoskeletal:  Negative for back pain, joint swelling and myalgias.   Skin:  Negative for rash.   Allergic/Immunologic: Negative for immunocompromised state.   Neurological:  Negative for dizziness, weakness, light-headedness and headaches.   Hematological:  Negative for adenopathy. Does not bruise/bleed easily.   Psychiatric/Behavioral:  Negative for behavioral problems, decreased concentration and sleep disturbance. The patient is nervous/anxious.          Vital Signs:     Wt Readings from Last 3 Encounters:   03/11/25 78.7 kg (173 lb 6.4 oz) (83%, Z= 0.97)*   08/15/23 67.2 kg (148 lb 3.2 oz) (71%, Z= 0.56)*   02/07/23 64.8 kg (142 lb 15.5 oz) (71%, Z= 0.56)*     * Growth percentiles are based on [facility] (Boys, 2-20 Years) data.     Temp Readings from Last 3 Encounters:   02/07/23 98.9 °F (37.2 °C)   01/12/23 98 °F (36.7 °C) (Oral)     BP Readings from Last 3 Encounters:   03/11/25 (!) 110/49 (27%, Z = -0.61 /  5%, Z = -1.64)*   08/15/23 (!) 112/57 (42%, Z = -0.20 /  20%, Z = -0.84)*   02/07/23 127/64 (88%, Z = 1.17 /  44%, Z = -0.15)*     *BP percentiles are based on the 2017 AAP Clinical Practice Guideline for boys     Pulse Readings from Last 3 Encounters:   03/11/25 68   08/15/23 73   02/07/23 93        Physical  Exam:      Physical Exam  Vitals reviewed.   Constitutional:       General: He is not in acute distress.     Appearance: He is well-developed.   HENT:      Head: Normocephalic and atraumatic.      Nose: Nose normal.   Eyes:      Pupils: Pupils are equal, round, and reactive to light.   Cardiovascular:      Rate and Rhythm: Normal rate and regular rhythm.      Heart sounds: Normal heart sounds. No murmur heard.  Pulmonary:      Effort: Pulmonary effort is normal. No respiratory distress.      Breath sounds: Normal breath sounds.   Abdominal:      General: Bowel sounds are normal. There is no distension.      Palpations: Abdomen is soft. There is no mass.      Tenderness: There is no abdominal tenderness.   Musculoskeletal:         General: Normal range of motion.      Cervical back: Normal range of motion and neck supple.   Lymphadenopathy:      Cervical: No cervical adenopathy.   Skin:     General: Skin is warm.      Capillary Refill: Capillary refill takes less than 2 seconds.      Coloration: Skin is not pale.      Findings: No rash.      Comments: Well healed vertical lower back incision   Neurological:      General: No focal deficit present.      Mental Status: He is alert and oriented to person, place, and time.   Psychiatric:         Mood and Affect: Mood normal.               Laboratory:     No visits with results within 10 Day(s) from this visit.   Latest known visit with results is:   Admission on 01/09/2023, Discharged on 01/12/2023   Component Date Value Ref Range Status    Sodium 01/09/2023 139  136 - 145 mmol/L Final    Potassium 01/09/2023 3.9  3.5 - 5.1 mmol/L Final    Chloride 01/09/2023 107  95 - 110 mmol/L Final    CO2 01/09/2023 22 (L)  23 - 29 mmol/L Final    Glucose 01/09/2023 87  70 - 110 mg/dL Final    BUN 01/09/2023 12  5 - 18 mg/dL Final    Creatinine 01/09/2023 0.9  0.5 - 1.4 mg/dL Final    Calcium 01/09/2023 9.9  8.7 - 10.5 mg/dL Final    Anion Gap 01/09/2023 10  8 - 16 mmol/L Final     eGFR 01/09/2023 SEE COMMENT  >60 mL/min/1.73 m^2 Final    Comment: Test not performed. GFR calculation is only valid for patients   19 and older.      WBC 01/09/2023 4.81  4.50 - 13.50 K/uL Final    RBC 01/09/2023 5.69 (H)  4.50 - 5.30 M/uL Final    Hemoglobin 01/09/2023 16.4 (H)  13.0 - 16.0 g/dL Final    Hematocrit 01/09/2023 49.3 (H)  37.0 - 47.0 % Final    MCV 01/09/2023 87  78 - 98 fL Final    MCH 01/09/2023 28.8  25.0 - 35.0 pg Final    MCHC 01/09/2023 33.3  31.0 - 37.0 g/dL Final    RDW 01/09/2023 12.6  11.5 - 14.5 % Final    Platelets 01/09/2023 322  150 - 450 K/uL Final    MPV 01/09/2023 9.5  9.2 - 12.9 fL Final    Immature Granulocytes 01/09/2023 0.4  0.0 - 0.5 % Final    Gran # (ANC) 01/09/2023 2.3  1.8 - 8.0 K/uL Final    Immature Grans (Abs) 01/09/2023 0.02  0.00 - 0.04 K/uL Final    Comment: Mild elevation in immature granulocytes is non specific and   can be seen in a variety of conditions including stress response,   acute inflammation, trauma and pregnancy. Correlation with other   laboratory and clinical findings is essential.      Lymph # 01/09/2023 1.9  1.2 - 5.8 K/uL Final    Mono # 01/09/2023 0.4  0.2 - 0.8 K/uL Final    Eos # 01/09/2023 0.2  0.0 - 0.4 K/uL Final    Baso # 01/09/2023 0.05  0.01 - 0.05 K/uL Final    nRBC 01/09/2023 0  0 /100 WBC Final    Gran % 01/09/2023 47.3  40.0 - 59.0 % Final    Lymph % 01/09/2023 39.9  27.0 - 45.0 % Final    Mono % 01/09/2023 7.9  4.1 - 12.3 % Final    Eosinophil % 01/09/2023 3.5  0.0 - 4.0 % Final    Basophil % 01/09/2023 1.0 (H)  0.0 - 0.7 % Final    Differential Method 01/09/2023 Automated   Final    Prothrombin Time 01/09/2023 12.2  9.0 - 12.5 sec Final    INR 01/09/2023 1.2  0.8 - 1.2 Final    Comment: Coumadin Therapy:  2.0 - 3.0 for INR for all indicators except mechanical heart valves  and antiphospholipid syndromes which should use 2.5 - 3.5.      aPTT 01/09/2023 32.9 (H)  21.0 - 32.0 sec Final    aPTT therapeutic range = 39-69 seconds    Group &  Rh 01/09/2023 O POS   Final    Indirect Khai 01/09/2023 NEG   Final    Final Pathologic Diagnosis 01/09/2023    Final                    Value:1 and 2. Spinal cord, conus medullaris, laminectomy with resection:  - Myxopapillary ependymoma    CNS WHO Grade 2    CAP Synoptic Checklist for CNS Neoplasms:    - History of Prior Therapy for this Neoplasm: Not known    - History of Previous Tumor and/or Familial Syndrome: Not known    - Neuroimaging Findings: Well-circumscribed, contrast-enhancing, intradural,       extramedullary mass, centered at the L2 spine level    - Procedure: Laminectomy with resection    - Specimen Size: 1.3 x 1.2  0.5 cm; 2.0 x 1.4 x 1.0 cm    - Tumor Site: Spinal cord, conus medullaris    - Tumor Laterality: Midline    - Tumor Focality: Unifocal    - Histologic Type: Myxopapillary ependymoma    - Histologic Grade: CNS WHO Grade 2    - Treatment Effect: Not identified    - Additional Findings: None    - Biomarker Studies: Available at clinician's request    - Designated Block for Future Studies: [id number]      Interp By Tera Marshall MD, PhD, Signed on 01/22/2023 at 12:59    Frozen Section Diagnosis 01/09/2023    Final                    Value:Spec #1  Myxoid neoplasm  Verbally Reported To:  Dr Delgado  Pathologist:  Dr Marshall      Microscopic Exam 01/09/2023    Final                    Value:Microscopic examination showed a glial neoplasm consisting of ependymal  cells, radially arranged around blood vessels, with intervening myxoid lakes  and microcysts; these findings were consistent with the diagnosis of  myxopapillary ependymoma.  There is minimal mitotic activity (less than 1  mitosis per 10 high-power fields) and no necrosis identified.  Immunostains for GFAP, EMA and Ki67 proliferation index were performed: GFAP  was diffusely immunopositive; EMA was negative; Ki67 proliferation index was  approximately 2-3%.  Positive controls and internal negative controls for  immunostains were  "examined and were appropriate.  The diagnosis of myxopapillary ependymoma was congruent with preoperative MR  imaging studies (EPIC, 01/06/2023), which showed a well-circumscribed,  contrast-enhancing, intradural, extramedullary mass, centered at the L2 spine  level.      Gross 01/09/2023    Final                    Value:Pathology ID:  [id number]  Patient ID: [id number]  Received in 2 parts:  Part 1:  Pathology ID:  [id number]  Patient ID: [id number]  Received fresh for IOC and subsequently placed in formalin labeled  "intradural tumor-frozen" is a pink-tan fragment of soft tissue measuring 1.3  x 1.2 x 0.5 cm.  The specimen is submitted entirely in cassette  ZMB--1-A-FS  Part 2:  Pathology ID:  [id number]  Patient ID: [id number]  The specimen is received in formalin labeled "intradural tumor-permanent".  The specimen consists of a dark brown, soft fragment of soft tissue measuring  2.0 x 1.4 x 1.0 cm.  The specimen is serially sectioned to reveal a dark  brown, soft cut surface.  The specimen is submitted entirely in cassette  ZEP--2-A  JER--2-B  YASMINE Weber MS      Frozen Section Footnote 01/09/2023    Final                    Value:Frozen section performed at [facility], [address] [place] [address], [id number]      Disclaimer 01/09/2023    Final                    Value:Unless the case is a 'gross only' or additional testing only, the final  diagnosis for each specimen is based on a microscopic examination of  appropriate tissue sections.      WBC 01/09/2023 8.91  4.50 - 13.50 K/uL Final    RBC 01/09/2023 5.36 (H)  4.50 - 5.30 M/uL Final    Hemoglobin 01/09/2023 15.6  13.0 - 16.0 g/dL Final    Hematocrit 01/09/2023 45.0  37.0 - 47.0 % Final    MCV 01/09/2023 84  78 - 98 fL Final    MCH 01/09/2023 29.1  25.0 - 35.0 pg Final    MCHC 01/09/2023 34.7  31.0 - 37.0 g/dL Final    RDW 01/09/2023 12.4  11.5 - 14.5 % Final    Platelets 01/09/2023 286  150 - 450 K/uL Final    MPV 01/09/2023 9.1 " (L)  9.2 - 12.9 fL Final    Immature Granulocytes 01/09/2023 0.4  0.0 - 0.5 % Final    Gran # (ANC) 01/09/2023 8.1 (H)  1.8 - 8.0 K/uL Final    Immature Grans (Abs) 01/09/2023 0.04  0.00 - 0.04 K/uL Final    Comment: Mild elevation in immature granulocytes is non specific and   can be seen in a variety of conditions including stress response,   acute inflammation, trauma and pregnancy. Correlation with other   laboratory and clinical findings is essential.      Lymph # 01/09/2023 0.7 (L)  1.2 - 5.8 K/uL Final    Mono # 01/09/2023 0.1 (L)  0.2 - 0.8 K/uL Final    Eos # 01/09/2023 0.0  0.0 - 0.4 K/uL Final    Baso # 01/09/2023 0.01  0.01 - 0.05 K/uL Final    nRBC 01/09/2023 0  0 /100 WBC Final    Gran % 01/09/2023 91.0 (H)  40.0 - 59.0 % Final    Lymph % 01/09/2023 7.4 (L)  27.0 - 45.0 % Final    Mono % 01/09/2023 1.1 (L)  4.1 - 12.3 % Final    Eosinophil % 01/09/2023 0.0  0.0 - 4.0 % Final    Basophil % 01/09/2023 0.1  0.0 - 0.7 % Final    Differential Method 01/09/2023 Automated   Final    Sodium 01/09/2023 141  136 - 145 mmol/L Final    Potassium 01/09/2023 4.0  3.5 - 5.1 mmol/L Final    Chloride 01/09/2023 112 (H)  95 - 110 mmol/L Final    CO2 01/09/2023 22 (L)  23 - 29 mmol/L Final    Glucose 01/09/2023 121 (H)  70 - 110 mg/dL Final    BUN 01/09/2023 10  5 - 18 mg/dL Final    Creatinine 01/09/2023 1.0  0.5 - 1.4 mg/dL Final    Calcium 01/09/2023 9.7  8.7 - 10.5 mg/dL Final    Anion Gap 01/09/2023 7 (L)  8 - 16 mmol/L Final    eGFR 01/09/2023 SEE COMMENT  >60 mL/min/1.73 m^2 Final    Comment: Test not performed. GFR calculation is only valid for patients   19 and older.      WBC 01/10/2023 17.19 (H)  4.50 - 13.50 K/uL Final    RBC 01/10/2023 4.90  4.50 - 5.30 M/uL Final    Hemoglobin 01/10/2023 14.5  13.0 - 16.0 g/dL Final    Hematocrit 01/10/2023 41.7  37.0 - 47.0 % Final    MCV 01/10/2023 85  78 - 98 fL Final    MCH 01/10/2023 29.6  25.0 - 35.0 pg Final    MCHC 01/10/2023 34.8  31.0 - 37.0 g/dL Final    RDW  01/10/2023 12.5  11.5 - 14.5 % Final    Platelets 01/10/2023 316  150 - 450 K/uL Final    MPV 01/10/2023 10.2  9.2 - 12.9 fL Final    Immature Granulocytes 01/10/2023 0.9 (H)  0.0 - 0.5 % Final    Gran # (ANC) 01/10/2023 15.1 (H)  1.8 - 8.0 K/uL Final    Immature Grans (Abs) 01/10/2023 0.15 (H)  0.00 - 0.04 K/uL Final    Comment: Mild elevation in immature granulocytes is non specific and   can be seen in a variety of conditions including stress response,   acute inflammation, trauma and pregnancy. Correlation with other   laboratory and clinical findings is essential.      Lymph # 01/10/2023 0.8 (L)  1.2 - 5.8 K/uL Final    Mono # 01/10/2023 1.2 (H)  0.2 - 0.8 K/uL Final    Eos # 01/10/2023 0.0  0.0 - 0.4 K/uL Final    Baso # 01/10/2023 0.01  0.01 - 0.05 K/uL Final    nRBC 01/10/2023 0  0 /100 WBC Final    Gran % 01/10/2023 87.6 (H)  40.0 - 59.0 % Final    Lymph % 01/10/2023 4.7 (L)  27.0 - 45.0 % Final    Mono % 01/10/2023 6.7  4.1 - 12.3 % Final    Eosinophil % 01/10/2023 0.0  0.0 - 4.0 % Final    Basophil % 01/10/2023 0.1  0.0 - 0.7 % Final    Differential Method 01/10/2023 Automated   Final    Sodium 01/10/2023 138  136 - 145 mmol/L Final    Potassium 01/10/2023 3.9  3.5 - 5.1 mmol/L Final    Chloride 01/10/2023 109  95 - 110 mmol/L Final    CO2 01/10/2023 18 (L)  23 - 29 mmol/L Final    Glucose 01/10/2023 130 (H)  70 - 110 mg/dL Final    BUN 01/10/2023 9  5 - 18 mg/dL Final    Creatinine 01/10/2023 0.9  0.5 - 1.4 mg/dL Final    Calcium 01/10/2023 9.0  8.7 - 10.5 mg/dL Final    Anion Gap 01/10/2023 11  8 - 16 mmol/L Final    eGFR 01/10/2023 SEE COMMENT  >60 mL/min/1.73 m^2 Final    Comment: Test not performed. GFR calculation is only valid for patients   19 and older.      WBC 01/11/2023 17.96 (H)  4.50 - 13.50 K/uL Final    RBC 01/11/2023 4.54  4.50 - 5.30 M/uL Final    Hemoglobin 01/11/2023 13.7  13.0 - 16.0 g/dL Final    Hematocrit 01/11/2023 40.0  37.0 - 47.0 % Final    MCV 01/11/2023 88  78 - 98 fL Final     MCH 01/11/2023 30.2  25.0 - 35.0 pg Final    MCHC 01/11/2023 34.3  31.0 - 37.0 g/dL Final    RDW 01/11/2023 12.9  11.5 - 14.5 % Final    Platelets 01/11/2023 288  150 - 450 K/uL Final    MPV 01/11/2023 9.6  9.2 - 12.9 fL Final    Immature Granulocytes 01/11/2023 0.7 (H)  0.0 - 0.5 % Final    Gran # (ANC) 01/11/2023 15.3 (H)  1.8 - 8.0 K/uL Final    Immature Grans (Abs) 01/11/2023 0.13 (H)  0.00 - 0.04 K/uL Final    Comment: Mild elevation in immature granulocytes is non specific and   can be seen in a variety of conditions including stress response,   acute inflammation, trauma and pregnancy. Correlation with other   laboratory and clinical findings is essential.      Lymph # 01/11/2023 0.8 (L)  1.2 - 5.8 K/uL Final    Mono # 01/11/2023 1.7 (H)  0.2 - 0.8 K/uL Final    Eos # 01/11/2023 0.0  0.0 - 0.4 K/uL Final    Baso # 01/11/2023 0.02  0.01 - 0.05 K/uL Final    nRBC 01/11/2023 0  0 /100 WBC Final    Gran % 01/11/2023 85.1 (H)  40.0 - 59.0 % Final    Lymph % 01/11/2023 4.7 (L)  27.0 - 45.0 % Final    Mono % 01/11/2023 9.4  4.1 - 12.3 % Final    Eosinophil % 01/11/2023 0.0  0.0 - 4.0 % Final    Basophil % 01/11/2023 0.1  0.0 - 0.7 % Final    Differential Method 01/11/2023 Automated   Final    WBC 01/12/2023 11.81  4.50 - 13.50 K/uL Final    RBC 01/12/2023 4.33 (L)  4.50 - 5.30 M/uL Final    Hemoglobin 01/12/2023 12.6 (L)  13.0 - 16.0 g/dL Final    Hematocrit 01/12/2023 38.2  37.0 - 47.0 % Final    MCV 01/12/2023 88  78 - 98 fL Final    MCH 01/12/2023 29.1  25.0 - 35.0 pg Final    MCHC 01/12/2023 33.0  31.0 - 37.0 g/dL Final    RDW 01/12/2023 12.8  11.5 - 14.5 % Final    Platelets 01/12/2023 266  150 - 450 K/uL Final    MPV 01/12/2023 9.7  9.2 - 12.9 fL Final    Immature Granulocytes 01/12/2023 0.4  0.0 - 0.5 % Final    Gran # (ANC) 01/12/2023 7.9  1.8 - 8.0 K/uL Final    Immature Grans (Abs) 01/12/2023 0.05 (H)  0.00 - 0.04 K/uL Final    Comment: Mild elevation in immature granulocytes is non specific and   can  be seen in a variety of conditions including stress response,   acute inflammation, trauma and pregnancy. Correlation with other   laboratory and clinical findings is essential.      Lymph # 01/12/2023 2.4  1.2 - 5.8 K/uL Final    Mono # 01/12/2023 1.4 (H)  0.2 - 0.8 K/uL Final    Eos # 01/12/2023 0.0  0.0 - 0.4 K/uL Final    Baso # 01/12/2023 0.01  0.01 - 0.05 K/uL Final    nRBC 01/12/2023 0  0 /100 WBC Final    Gran % 01/12/2023 67.2 (H)  40.0 - 59.0 % Final    Lymph % 01/12/2023 20.3 (L)  27.0 - 45.0 % Final    Mono % 01/12/2023 11.9  4.1 - 12.3 % Final    Eosinophil % 01/12/2023 0.1  0.0 - 4.0 % Final    Basophil % 01/12/2023 0.1  0.0 - 0.7 % Final    Differential Method 01/12/2023 Automated   Final        Imaging:   MRI LUMBAR SPINE W WO CONTRAST  Narrative: EXAMINATION:  MRI LUMBAR SPINE W WO CONTRAST    CLINICAL HISTORY:  post-op;    TECHNIQUE:  Multiplanar, multisequence MR images of the lumbar spine were performed prior to and following the administration of 7 mL Gadavist contrast.    COMPARISON:  MRI lumbar spine 11/18/2022; lumbar spine radiograph 11/04/2022    FINDINGS:  Alignment: Normal.    Vertebrae: Normal marrow signal.  No focal marrow lesions.  No fracture.  Postoperative changes L1-L3 laminectomies.    Discs: Normal height and signal.    Cord: Postoperative changes of lumbar intradural extramedullary lesion resection at L2.  The lower thoracic spinal cord demonstrates normal signal and contour without any focal signal abnormality.  No abnormal cord enhancement.  Cystic dilation of the central canal of the distal cord at the level of L1 extending up to 1.8 cm in length and measuring up to 0.5 cm in AP dimension.  No evidence for residual lesion.  No epidural hematoma or other fluid collection.  No abnormal cauda equina nerve root clumping or enhancement.    Degenerative findings:    No bony neural foraminal narrowing.  Spinal canal is widely patent without significant stenosis.    Paraspinal  muscles & soft tissues: Normal paraspinous muscular bulk and signal.  Expected operative changes in the dorsal lumbar soft tissues.  Impression: Postoperative changes of L1-L3 laminectomies for lumbar intradural lesion resection without complication.    Gross total resection without residual tumor.    Electronically signed by resident: Devendra Mathew  Date:    01/10/2023  Time:    09:09    Electronically signed by: Gilbert Gregory  Date:    01/10/2023  Time:    13:53       Assessment:       1. Myxopapillary ependymoma of spinal cord    2. Anxiety about treatment                Plan:       Problem List Items Addressed This Visit       Myxopapillary ependymoma of spinal cord - Primary    Overview   Doing well. Last MRI spine was clear 2 years ago. Was supposed to have last year but didn't. Needs MRI for surveillance. Printed order and will have performed at local imaging center. Doing well. Agree with Neurosurgery recs for yearly monitoring. Will f/u in 1 year. Contact my office for any concerns.       Initial Hx:   Gross total resection in January. WHO Grade 1. Offered [facility] APEC study enrollment and parents consented and Juarez assented and signed study for enrollment. All questions answered. Since GTR and low grade tumor he does not need adjuvant chemotherapy or radiation. Monitor clinically and f/u mri in ~ 3 months. Advised to notify me after MRI performed so that I can get results. F/u with me in 6 months in [place].          Relevant Medications    LORazepam (ATIVAN) 1 MG tablet    Other Relevant Orders    MRI Lumbar Spine W WO Contrast    Anxiety about treatment    Overview   Significant anxiety with needles. Will need IV for MRI of spine. Ordered ativan to take 30-60 min prior to IV placement for MRI.                     Kevin Sears MD  [facility] [place]